# Patient Record
Sex: MALE | Race: WHITE | NOT HISPANIC OR LATINO | Employment: FULL TIME | URBAN - NONMETROPOLITAN AREA
[De-identification: names, ages, dates, MRNs, and addresses within clinical notes are randomized per-mention and may not be internally consistent; named-entity substitution may affect disease eponyms.]

---

## 2017-11-16 ENCOUNTER — APPOINTMENT (OUTPATIENT)
Dept: GENERAL RADIOLOGY | Facility: HOSPITAL | Age: 52
End: 2017-11-16

## 2017-11-16 ENCOUNTER — HOSPITAL ENCOUNTER (EMERGENCY)
Facility: HOSPITAL | Age: 52
Discharge: HOME | End: 2017-11-16
Attending: EMERGENCY MEDICINE | Admitting: EMERGENCY MEDICINE

## 2017-11-16 VITALS
HEART RATE: 70 BPM | TEMPERATURE: 97.7 F | HEIGHT: 63 IN | WEIGHT: 157 LBS | DIASTOLIC BLOOD PRESSURE: 85 MMHG | SYSTOLIC BLOOD PRESSURE: 148 MMHG | BODY MASS INDEX: 27.82 KG/M2 | RESPIRATION RATE: 17 BRPM | OXYGEN SATURATION: 98 %

## 2017-11-16 DIAGNOSIS — S60.222A CONTUSION OF LEFT HAND, INITIAL ENCOUNTER: Primary | ICD-10-CM

## 2017-11-16 PROCEDURE — 99283 EMERGENCY DEPT VISIT LOW MDM: CPT

## 2017-11-16 PROCEDURE — 73130 X-RAY EXAM OF HAND: CPT

## 2017-11-16 NOTE — ED PROVIDER NOTES
Subjective   HPI Comments: 52-year-old male presents with injury to his left third fourth digits after being smashed between metal at work.  He states most of his pain is in the left ring finger, he can move all extremities and his fingers well.  He has mild bruising on the left ring finger.  No break in the skin the laceration.      History provided by:  Patient   used: No        Review of Systems   Musculoskeletal:        Left hand injury   All other systems reviewed and are negative.      Past Medical History:   Diagnosis Date   • Arthritis        No Known Allergies    History reviewed. No pertinent surgical history.    History reviewed. No pertinent family history.    Social History     Social History   • Marital status: Single     Spouse name: N/A   • Number of children: N/A   • Years of education: N/A     Social History Main Topics   • Smoking status: Never Smoker   • Smokeless tobacco: Never Used   • Alcohol use No   • Drug use: No   • Sexual activity: Not Asked     Other Topics Concern   • None     Social History Narrative   • None           Objective   Physical Exam   Constitutional: He is oriented to person, place, and time. He appears well-developed and well-nourished.   HENT:   Head: Normocephalic and atraumatic.   Eyes: EOM are normal.   Neck: Normal range of motion.   Cardiovascular: Normal rate and regular rhythm.    Pulmonary/Chest: Effort normal and breath sounds normal.   Abdominal: Soft.   Musculoskeletal: Normal range of motion. He exhibits tenderness. He exhibits no edema or deformity.   Mild tenderness to palpation left ring finger, neurovascular intact good range of motion.   Neurological: He is alert and oriented to person, place, and time.   Skin: Skin is warm and dry.   Psychiatric: He has a normal mood and affect. His behavior is normal. Judgment and thought content normal.   Nursing note and vitals reviewed.      Procedures         ED Course  ED Course                   [facility]    Final diagnoses:   Contusion of left hand, initial encounter            Bradley Schofield Jr., PA-C  11/16/17 1200

## 2018-10-05 ENCOUNTER — HOSPITAL ENCOUNTER (EMERGENCY)
Facility: HOSPITAL | Age: 53
Discharge: HOME | End: 2018-10-05
Attending: EMERGENCY MEDICINE | Admitting: EMERGENCY MEDICINE

## 2018-10-05 VITALS
RESPIRATION RATE: 16 BRPM | SYSTOLIC BLOOD PRESSURE: 153 MMHG | WEIGHT: 158.8 LBS | HEART RATE: 73 BPM | OXYGEN SATURATION: 99 % | HEIGHT: 62 IN | TEMPERATURE: 98.2 F | BODY MASS INDEX: 29.22 KG/M2 | DIASTOLIC BLOOD PRESSURE: 79 MMHG

## 2018-10-05 DIAGNOSIS — J06.9 VIRAL URI WITH COUGH: Primary | ICD-10-CM

## 2018-10-05 LAB — S PYO AG THROAT QL: NEGATIVE

## 2018-10-05 PROCEDURE — 99283 EMERGENCY DEPT VISIT LOW MDM: CPT

## 2018-10-05 PROCEDURE — 87880 STREP A ASSAY W/OPTIC: CPT

## 2018-10-05 PROCEDURE — 87081 CULTURE SCREEN ONLY: CPT

## 2018-10-05 RX ORDER — PSEUDOEPHEDRINE HYDROCHLORIDE 60 MG/1
60 TABLET, FILM COATED ORAL EVERY 6 HOURS PRN
Qty: 10 TABLET | Refills: 0 | OUTPATIENT
Start: 2018-10-05 | End: 2019-06-08 | Stop reason: HOSPADM

## 2018-10-05 RX ORDER — GUAIFENESIN 200 MG/10ML
200 LIQUID ORAL EVERY 4 HOURS PRN
Qty: 240 ML | Refills: 0 | OUTPATIENT
Start: 2018-10-05 | End: 2019-06-08 | Stop reason: HOSPADM

## 2018-10-05 NOTE — ED PROVIDER NOTES
Subjective   This is a 53-year-old male comes in with chief complaint sore throat, cough, congestion past 3 days.  Patient states he has had mild productive cough with scant white sputum.  Patient denies any associated fever, chills, shortness of breath.  Patient denies any previous history of COPD or asthma.  Denies any known sick contacts.        History provided by:  Patient   used: No    URI   Presenting symptoms: congestion, cough, fatigue, rhinorrhea and sore throat    Presenting symptoms: no fever    Severity:  Moderate  Onset quality:  Sudden  Duration:  3 days  Timing:  Intermittent  Progression:  Worsening  Chronicity:  New  Relieved by:  Nothing  Worsened by:  Nothing  Ineffective treatments:  None tried  Associated symptoms: no arthralgias, no headaches, no myalgias and no neck pain    Risk factors: not elderly, no chronic cardiac disease, no chronic kidney disease, no chronic respiratory disease, no diabetes mellitus, no recent illness, no recent travel and no sick contacts        Review of Systems   Constitutional: Positive for fatigue. Negative for fever.   HENT: Positive for congestion, rhinorrhea and sore throat.    Respiratory: Positive for cough. Negative for chest tightness, shortness of breath and stridor.    Musculoskeletal: Negative for arthralgias, myalgias and neck pain.   Neurological: Negative for headaches.   All other systems reviewed and are negative.      Past Medical History:   Diagnosis Date   • Arthritis    • Collar bone fracture    • Head injury due to trauma    • Shoulder fracture, left        No Known Allergies    History reviewed. No pertinent surgical history.    History reviewed. No pertinent family history.    Social History     Social History   • Marital status: Single     Social History Main Topics   • Smoking status: Never Smoker   • Smokeless tobacco: Never Used   • Alcohol use No   • Drug use: No     Other Topics Concern   • Not on file            Objective   Physical Exam   Constitutional: He is oriented to person, place, and time. He appears well-developed and well-nourished. No distress.   HENT:   Head: Normocephalic.   Right Ear: External ear normal.   Left Ear: External ear normal.   Nose: Rhinorrhea present. Right sinus exhibits no maxillary sinus tenderness and no frontal sinus tenderness. Left sinus exhibits no frontal sinus tenderness.   Mouth/Throat: Mucous membranes are not pale and not dry. Posterior oropharyngeal erythema present. No oropharyngeal exudate. Tonsils are 0 on the right. Tonsils are 0 on the left.   Eyes: Pupils are equal, round, and reactive to light. Conjunctivae and EOM are normal. Right eye exhibits no discharge. Left eye exhibits no discharge. No scleral icterus.   Neck: Normal range of motion. Neck supple. No JVD present. No tracheal deviation present. No thyromegaly present.   Cardiovascular: Normal rate, regular rhythm, normal heart sounds and intact distal pulses.  Exam reveals no friction rub.    No murmur heard.  Pulmonary/Chest: Effort normal and breath sounds normal. No stridor. No respiratory distress. He has no wheezes. He has no rales.   Abdominal: Soft. Bowel sounds are normal. He exhibits no distension and no mass. There is no tenderness. There is no rebound and no guarding.   Musculoskeletal: Normal range of motion. He exhibits no edema, tenderness or deformity.   Neurological: He is alert and oriented to person, place, and time. He displays normal reflexes. No cranial nerve deficit. He exhibits normal muscle tone. Coordination normal.   Skin: Skin is warm. Capillary refill takes less than 2 seconds. No rash noted. He is not diaphoretic. No erythema. No pallor.   Psychiatric: He has a normal mood and affect. His behavior is normal. Judgment and thought content normal.   Nursing note and vitals reviewed.      Procedures           ED Course  ED Course as of Oct 05 1616   Fri Oct 05, 2018   1033 Patient  nontoxic in appearance.  Will be treated supportively.  Advised to return if condition worsens.  []      ED Course User Index  [] Rishabh Barragan PA-C                  MDM      Final diagnoses:   Viral URI with cough            Rishabh Barragan PA-C  10/05/18 2242

## 2018-10-07 LAB — BACTERIA SPEC AEROBE CULT: NORMAL

## 2019-06-08 ENCOUNTER — HOSPITAL ENCOUNTER (EMERGENCY)
Facility: HOSPITAL | Age: 54
Discharge: HOME | End: 2019-06-08
Attending: EMERGENCY MEDICINE | Admitting: EMERGENCY MEDICINE

## 2019-06-08 ENCOUNTER — APPOINTMENT (OUTPATIENT)
Dept: GENERAL RADIOLOGY | Facility: HOSPITAL | Age: 54
End: 2019-06-08

## 2019-06-08 VITALS
HEIGHT: 63 IN | SYSTOLIC BLOOD PRESSURE: 144 MMHG | RESPIRATION RATE: 17 BRPM | TEMPERATURE: 98.3 F | WEIGHT: 158.6 LBS | BODY MASS INDEX: 28.1 KG/M2 | HEART RATE: 74 BPM | OXYGEN SATURATION: 97 % | DIASTOLIC BLOOD PRESSURE: 84 MMHG

## 2019-06-08 DIAGNOSIS — J20.9 ACUTE BRONCHITIS, UNSPECIFIED ORGANISM: Primary | ICD-10-CM

## 2019-06-08 DIAGNOSIS — R09.81 SINUS CONGESTION: ICD-10-CM

## 2019-06-08 LAB
FLUAV AG NPH QL: NEGATIVE
FLUBV AG NPH QL IA: NEGATIVE
S PYO AG THROAT QL: NEGATIVE

## 2019-06-08 PROCEDURE — 63710000001 PREDNISONE PER 1 MG: Performed by: PHYSICIAN ASSISTANT

## 2019-06-08 PROCEDURE — 87081 CULTURE SCREEN ONLY: CPT | Performed by: EMERGENCY MEDICINE

## 2019-06-08 PROCEDURE — 71046 X-RAY EXAM CHEST 2 VIEWS: CPT

## 2019-06-08 PROCEDURE — 87804 INFLUENZA ASSAY W/OPTIC: CPT | Performed by: EMERGENCY MEDICINE

## 2019-06-08 PROCEDURE — 87880 STREP A ASSAY W/OPTIC: CPT | Performed by: EMERGENCY MEDICINE

## 2019-06-08 PROCEDURE — 99283 EMERGENCY DEPT VISIT LOW MDM: CPT

## 2019-06-08 RX ORDER — SENNOSIDES 8.6 MG
650 CAPSULE ORAL EVERY 8 HOURS PRN
Qty: 12 TABLET | Refills: 0 | Status: SHIPPED | OUTPATIENT
Start: 2019-06-08 | End: 2022-01-19

## 2019-06-08 RX ORDER — GUAIFENESIN 200 MG/10ML
200 LIQUID ORAL EVERY 4 HOURS PRN
Qty: 120 ML | Refills: 0 | Status: SHIPPED | OUTPATIENT
Start: 2019-06-08 | End: 2019-06-18

## 2019-06-08 RX ORDER — CETIRIZINE HYDROCHLORIDE 10 MG/1
10 TABLET ORAL DAILY
Qty: 14 TABLET | Refills: 0 | Status: SHIPPED | OUTPATIENT
Start: 2019-06-08 | End: 2022-01-19

## 2019-06-08 RX ORDER — ACETAMINOPHEN 325 MG/1
650 TABLET ORAL ONCE
Status: COMPLETED | OUTPATIENT
Start: 2019-06-08 | End: 2019-06-08

## 2019-06-08 RX ORDER — PREDNISONE 20 MG/1
40 TABLET ORAL ONCE
Status: COMPLETED | OUTPATIENT
Start: 2019-06-08 | End: 2019-06-08

## 2019-06-08 RX ORDER — PREDNISONE 20 MG/1
20 TABLET ORAL 2 TIMES DAILY
Qty: 6 TABLET | Refills: 0 | Status: SHIPPED | OUTPATIENT
Start: 2019-06-08 | End: 2022-01-19

## 2019-06-08 RX ORDER — AZITHROMYCIN 250 MG/1
TABLET, FILM COATED ORAL
Qty: 6 TABLET | Refills: 0 | Status: SHIPPED | OUTPATIENT
Start: 2019-06-08 | End: 2022-01-19

## 2019-06-08 RX ADMIN — PREDNISONE 40 MG: 20 TABLET ORAL at 14:37

## 2019-06-08 RX ADMIN — ACETAMINOPHEN 650 MG: 325 TABLET, FILM COATED ORAL at 14:37

## 2019-06-08 NOTE — ED PROVIDER NOTES
Subjective   The patient is here with complaint of some sore throat nasal congestion sinus congestion some slight headache gradual onset for the past 3 days some otalgia associated occasional cough nonproductive for the most part occasional clear sputum production no hemoptysis no fevers reported no chest pain no abdominal pain no nausea or vomiting patient does request work excuse for today        History provided by:  Patient      Review of Systems   Constitutional: Negative for fever.   HENT: Positive for congestion, sinus pressure and sore throat.    Eyes: Negative.  Negative for pain and visual disturbance.   Respiratory: Positive for cough. Negative for shortness of breath.    Cardiovascular: Negative.    Gastrointestinal: Negative.    Genitourinary: Negative.    Musculoskeletal: Negative.  Negative for neck pain and neck stiffness.   Skin: Negative.    Neurological: Positive for headaches. Negative for seizures, syncope, speech difficulty and weakness.        Some occasional gradual onset headache   Psychiatric/Behavioral: Negative.    All other systems reviewed and are negative.      Past Medical History:   Diagnosis Date   • Arthritis    • Collar bone fracture    • Head injury due to trauma    • Shoulder fracture, left        No Known Allergies    History reviewed. No pertinent surgical history.    History reviewed. No pertinent family history.    Social History     Socioeconomic History   • Marital status: Single     Spouse name: Not on file   • Number of children: Not on file   • Years of education: Not on file   • Highest education level: Not on file   Tobacco Use   • Smoking status: Never Smoker   • Smokeless tobacco: Never Used   Substance and Sexual Activity   • Alcohol use: No   • Drug use: No           Objective   Physical Exam   Constitutional: He is oriented to person, place, and time. He appears well-developed and well-nourished. No distress.   Afebrile nontoxic no acute distress   HENT:   Head:  Normocephalic and atraumatic.   Right Ear: External ear normal.   Left Ear: External ear normal.   Mouth/Throat: Oropharynx is clear and moist. No oropharyngeal exudate.   Posterior pharynx clear to midline no airway compromise   Eyes: Conjunctivae and EOM are normal. Pupils are equal, round, and reactive to light.   Neck: Normal range of motion. Neck supple.   No meningismus   Cardiovascular: Normal rate, regular rhythm and intact distal pulses.   Pulmonary/Chest: Effort normal and breath sounds normal. No stridor. He has no wheezes.   Abdominal: Soft. Bowel sounds are normal. There is no tenderness. There is no guarding.   Musculoskeletal: Normal range of motion. He exhibits no edema.   Lymphadenopathy:     He has no cervical adenopathy.   Neurological: He is alert and oriented to person, place, and time. No cranial nerve deficit or sensory deficit. He exhibits normal muscle tone. Coordination normal.   Skin: Skin is warm and dry. Capillary refill takes less than 2 seconds. No rash noted. He is not diaphoretic. No erythema.   Psychiatric: He has a normal mood and affect. His behavior is normal. Judgment and thought content normal.   Nursing note and vitals reviewed.      Procedures           ED Course  ED Course as of Jun 08 1455   Sat Jun 08, 2019   1452 Plan on treating with Z-Duong, other medication including decongestant, follow-up with what has clinic within 2 days return here for any sudden changes worsening symptom concerns work excuse for today and tomorrow  [SC]      ED Course User Index  [SC] Boubacar Ocampo, GABRIELA                  MDM  Number of Diagnoses or Management Options     Amount and/or Complexity of Data Reviewed  Review and summarize past medical records: yes  Discuss the patient with other providers: yes    Risk of Complications, Morbidity, and/or Mortality  Presenting problems: low  Diagnostic procedures: low  Management options: low          Final diagnoses:   Acute bronchitis,  unspecified organism   Sinus congestion            Boubacar Ocampo, GABRIELA  06/08/19 7154

## 2019-06-11 LAB — BACTERIA SPEC AEROBE CULT: NORMAL

## 2019-09-26 ENCOUNTER — HOSPITAL ENCOUNTER (EMERGENCY)
Facility: HOSPITAL | Age: 54
Discharge: HOME | End: 2019-09-27
Attending: STUDENT IN AN ORGANIZED HEALTH CARE EDUCATION/TRAINING PROGRAM | Admitting: STUDENT IN AN ORGANIZED HEALTH CARE EDUCATION/TRAINING PROGRAM

## 2019-09-26 DIAGNOSIS — K08.89 PAIN, DENTAL: Primary | ICD-10-CM

## 2019-09-26 PROCEDURE — 99283 EMERGENCY DEPT VISIT LOW MDM: CPT

## 2019-09-26 RX ORDER — HYDROCODONE BITARTRATE AND ACETAMINOPHEN 5; 325 MG/1; MG/1
1 TABLET ORAL ONCE
Status: COMPLETED | OUTPATIENT
Start: 2019-09-27 | End: 2019-09-27

## 2019-09-26 RX ORDER — AMOXICILLIN AND CLAVULANATE POTASSIUM 875; 125 MG/1; MG/1
1 TABLET, FILM COATED ORAL ONCE
Status: COMPLETED | OUTPATIENT
Start: 2019-09-27 | End: 2019-09-27

## 2019-09-27 VITALS
OXYGEN SATURATION: 98 % | HEART RATE: 70 BPM | TEMPERATURE: 98 F | WEIGHT: 162 LBS | HEIGHT: 63 IN | RESPIRATION RATE: 18 BRPM | SYSTOLIC BLOOD PRESSURE: 172 MMHG | DIASTOLIC BLOOD PRESSURE: 95 MMHG | BODY MASS INDEX: 28.7 KG/M2

## 2019-09-27 RX ORDER — AMOXICILLIN AND CLAVULANATE POTASSIUM 875; 125 MG/1; MG/1
1 TABLET, FILM COATED ORAL EVERY 12 HOURS
Qty: 14 TABLET | Refills: 0 | Status: SHIPPED | OUTPATIENT
Start: 2019-09-27 | End: 2019-10-04

## 2019-09-27 RX ORDER — ETODOLAC 200 MG/1
200 CAPSULE ORAL EVERY 8 HOURS
Qty: 15 CAPSULE | Refills: 0 | Status: SHIPPED | OUTPATIENT
Start: 2019-09-27 | End: 2022-01-19

## 2019-09-27 RX ADMIN — AMOXICILLIN AND CLAVULANATE POTASSIUM 1 TABLET: 875; 125 TABLET, FILM COATED ORAL at 00:19

## 2019-09-27 RX ADMIN — HYDROCODONE BITARTRATE AND ACETAMINOPHEN 1 TABLET: 5; 325 TABLET ORAL at 00:19

## 2020-01-09 ENCOUNTER — HOSPITAL ENCOUNTER (EMERGENCY)
Facility: HOSPITAL | Age: 55
Discharge: HOME | End: 2020-01-09
Attending: EMERGENCY MEDICINE | Admitting: EMERGENCY MEDICINE

## 2020-01-09 VITALS
HEIGHT: 63 IN | RESPIRATION RATE: 18 BRPM | SYSTOLIC BLOOD PRESSURE: 160 MMHG | DIASTOLIC BLOOD PRESSURE: 81 MMHG | HEART RATE: 64 BPM | TEMPERATURE: 97.8 F | BODY MASS INDEX: 28.84 KG/M2 | OXYGEN SATURATION: 96 % | WEIGHT: 162.8 LBS

## 2020-01-09 DIAGNOSIS — K04.7 DENTAL ABSCESS: Primary | ICD-10-CM

## 2020-01-09 LAB — S PYO AG THROAT QL: NEGATIVE

## 2020-01-09 PROCEDURE — 87880 STREP A ASSAY W/OPTIC: CPT | Performed by: EMERGENCY MEDICINE

## 2020-01-09 PROCEDURE — 63710000001 DEXAMETHASONE PER 0.25 MG: Performed by: EMERGENCY MEDICINE

## 2020-01-09 PROCEDURE — 87081 CULTURE SCREEN ONLY: CPT | Performed by: EMERGENCY MEDICINE

## 2020-01-09 PROCEDURE — 99283 EMERGENCY DEPT VISIT LOW MDM: CPT

## 2020-01-09 RX ORDER — DEXAMETHASONE 4 MG/1
4 TABLET ORAL ONCE
Status: COMPLETED | OUTPATIENT
Start: 2020-01-09 | End: 2020-01-09

## 2020-01-09 RX ORDER — PENICILLIN V POTASSIUM 250 MG/1
500 TABLET ORAL ONCE
Status: COMPLETED | OUTPATIENT
Start: 2020-01-09 | End: 2020-01-09

## 2020-01-09 RX ORDER — PENICILLIN V POTASSIUM 500 MG/1
500 TABLET ORAL 4 TIMES DAILY
Qty: 40 TABLET | Refills: 0 | Status: SHIPPED | OUTPATIENT
Start: 2020-01-09 | End: 2022-01-19

## 2020-01-09 RX ADMIN — DEXAMETHASONE 4 MG: 4 TABLET ORAL at 02:39

## 2020-01-09 RX ADMIN — PENICILLIN V POTASIUM 500 MG: 250 TABLET ORAL at 02:39

## 2020-01-11 LAB — BACTERIA SPEC AEROBE CULT: NORMAL

## 2020-03-20 ENCOUNTER — APPOINTMENT (OUTPATIENT)
Dept: GENERAL RADIOLOGY | Facility: HOSPITAL | Age: 55
End: 2020-03-20

## 2020-03-20 ENCOUNTER — HOSPITAL ENCOUNTER (EMERGENCY)
Facility: HOSPITAL | Age: 55
Discharge: HOME | End: 2020-03-21
Attending: EMERGENCY MEDICINE | Admitting: EMERGENCY MEDICINE

## 2020-03-20 DIAGNOSIS — J06.9 VIRAL UPPER RESPIRATORY TRACT INFECTION: ICD-10-CM

## 2020-03-20 DIAGNOSIS — J40 BRONCHITIS: Primary | ICD-10-CM

## 2020-03-20 LAB
FLUAV AG NPH QL: NEGATIVE
FLUBV AG NPH QL IA: NEGATIVE
S PYO AG THROAT QL: NEGATIVE

## 2020-03-20 PROCEDURE — 87081 CULTURE SCREEN ONLY: CPT | Performed by: PHYSICIAN ASSISTANT

## 2020-03-20 PROCEDURE — 71045 X-RAY EXAM CHEST 1 VIEW: CPT

## 2020-03-20 PROCEDURE — 99283 EMERGENCY DEPT VISIT LOW MDM: CPT

## 2020-03-20 PROCEDURE — 87880 STREP A ASSAY W/OPTIC: CPT | Performed by: PHYSICIAN ASSISTANT

## 2020-03-20 PROCEDURE — 87804 INFLUENZA ASSAY W/OPTIC: CPT | Performed by: PHYSICIAN ASSISTANT

## 2020-03-20 RX ORDER — BENZONATATE 100 MG/1
100 CAPSULE ORAL 3 TIMES DAILY PRN
Qty: 12 CAPSULE | Refills: 0 | Status: SHIPPED | OUTPATIENT
Start: 2020-03-20 | End: 2022-01-19

## 2020-03-20 RX ORDER — BENZONATATE 100 MG/1
100 CAPSULE ORAL ONCE
Status: COMPLETED | OUTPATIENT
Start: 2020-03-20 | End: 2020-03-20

## 2020-03-20 RX ORDER — NAPROXEN 500 MG/1
500 TABLET ORAL 2 TIMES DAILY WITH MEALS
COMMUNITY

## 2020-03-20 RX ORDER — DOXYCYCLINE 100 MG/1
100 CAPSULE ORAL 2 TIMES DAILY
Qty: 14 CAPSULE | Refills: 0 | Status: SHIPPED | OUTPATIENT
Start: 2020-03-20 | End: 2020-03-27

## 2020-03-20 RX ORDER — DOXYCYCLINE 100 MG/1
100 CAPSULE ORAL ONCE
Status: COMPLETED | OUTPATIENT
Start: 2020-03-20 | End: 2020-03-20

## 2020-03-20 RX ADMIN — BENZONATATE 100 MG: 100 CAPSULE ORAL at 23:55

## 2020-03-20 RX ADMIN — DOXYCYCLINE 100 MG: 100 CAPSULE ORAL at 23:55

## 2020-03-21 VITALS
HEART RATE: 67 BPM | WEIGHT: 162 LBS | RESPIRATION RATE: 18 BRPM | HEIGHT: 63 IN | TEMPERATURE: 98 F | OXYGEN SATURATION: 96 % | BODY MASS INDEX: 28.7 KG/M2 | DIASTOLIC BLOOD PRESSURE: 88 MMHG | SYSTOLIC BLOOD PRESSURE: 166 MMHG

## 2020-03-22 LAB — BACTERIA SPEC AEROBE CULT: NORMAL

## 2021-06-29 ENCOUNTER — HOSPITAL ENCOUNTER (EMERGENCY)
Facility: HOSPITAL | Age: 56
Discharge: HOME | End: 2021-06-29
Attending: EMERGENCY MEDICINE | Admitting: EMERGENCY MEDICINE

## 2021-06-29 ENCOUNTER — APPOINTMENT (OUTPATIENT)
Dept: CT IMAGING | Facility: HOSPITAL | Age: 56
End: 2021-06-29

## 2021-06-29 VITALS
WEIGHT: 169.6 LBS | SYSTOLIC BLOOD PRESSURE: 168 MMHG | OXYGEN SATURATION: 97 % | BODY MASS INDEX: 30.05 KG/M2 | DIASTOLIC BLOOD PRESSURE: 94 MMHG | HEART RATE: 65 BPM | TEMPERATURE: 98.3 F | RESPIRATION RATE: 16 BRPM | HEIGHT: 63 IN

## 2021-06-29 DIAGNOSIS — R20.2 PARESTHESIAS: Primary | ICD-10-CM

## 2021-06-29 LAB
ALBUMIN SERPL-MCNC: 4.2 G/DL (ref 3.5–5.2)
ALBUMIN/GLOB SERPL: 1.7 G/DL
ALP SERPL-CCNC: 106 U/L (ref 39–117)
ALT SERPL W P-5'-P-CCNC: 68 U/L (ref 1–41)
ANION GAP SERPL CALCULATED.3IONS-SCNC: 8.8 MMOL/L (ref 5–15)
AST SERPL-CCNC: 47 U/L (ref 1–40)
BASOPHILS # BLD AUTO: 0.04 10*3/MM3 (ref 0–0.2)
BASOPHILS NFR BLD AUTO: 0.6 % (ref 0–1.5)
BILIRUB SERPL-MCNC: 0.3 MG/DL (ref 0–1.2)
BUN SERPL-MCNC: 19 MG/DL (ref 6–20)
BUN/CREAT SERPL: 25.3 (ref 7–25)
CALCIUM SPEC-SCNC: 9.5 MG/DL (ref 8.6–10.5)
CHLORIDE SERPL-SCNC: 109 MMOL/L (ref 98–107)
CO2 SERPL-SCNC: 26.2 MMOL/L (ref 22–29)
CREAT SERPL-MCNC: 0.75 MG/DL (ref 0.76–1.27)
DEPRECATED RDW RBC AUTO: 41.3 FL (ref 37–54)
EOSINOPHIL # BLD AUTO: 0.19 10*3/MM3 (ref 0–0.4)
EOSINOPHIL NFR BLD AUTO: 3 % (ref 0.3–6.2)
ERYTHROCYTE [DISTWIDTH] IN BLOOD BY AUTOMATED COUNT: 13.6 % (ref 12.3–15.4)
GFR SERPL CREATININE-BSD FRML MDRD: 108 ML/MIN/1.73
GLOBULIN UR ELPH-MCNC: 2.5 GM/DL
GLUCOSE SERPL-MCNC: 97 MG/DL (ref 65–99)
HCT VFR BLD AUTO: 44.4 % (ref 37.5–51)
HGB BLD-MCNC: 15.1 G/DL (ref 13–17.7)
IMM GRANULOCYTES # BLD AUTO: 0.03 10*3/MM3 (ref 0–0.05)
IMM GRANULOCYTES NFR BLD AUTO: 0.5 % (ref 0–0.5)
LYMPHOCYTES # BLD AUTO: 1.76 10*3/MM3 (ref 0.7–3.1)
LYMPHOCYTES NFR BLD AUTO: 27.3 % (ref 19.6–45.3)
MCH RBC QN AUTO: 28.2 PG (ref 26.6–33)
MCHC RBC AUTO-ENTMCNC: 34 G/DL (ref 31.5–35.7)
MCV RBC AUTO: 83 FL (ref 79–97)
MONOCYTES # BLD AUTO: 0.64 10*3/MM3 (ref 0.1–0.9)
MONOCYTES NFR BLD AUTO: 9.9 % (ref 5–12)
NEUTROPHILS NFR BLD AUTO: 3.78 10*3/MM3 (ref 1.7–7)
NEUTROPHILS NFR BLD AUTO: 58.7 % (ref 42.7–76)
NRBC BLD AUTO-RTO: 0 /100 WBC (ref 0–0.2)
PLATELET # BLD AUTO: 197 10*3/MM3 (ref 140–450)
PMV BLD AUTO: 9.6 FL (ref 6–12)
POTASSIUM SERPL-SCNC: 3.7 MMOL/L (ref 3.5–5.2)
PROT SERPL-MCNC: 6.7 G/DL (ref 6–8.5)
RBC # BLD AUTO: 5.35 10*6/MM3 (ref 4.14–5.8)
SODIUM SERPL-SCNC: 144 MMOL/L (ref 136–145)
WBC # BLD AUTO: 6.44 10*3/MM3 (ref 3.4–10.8)

## 2021-06-29 PROCEDURE — 70450 CT HEAD/BRAIN W/O DYE: CPT

## 2021-06-29 PROCEDURE — 99283 EMERGENCY DEPT VISIT LOW MDM: CPT

## 2021-06-29 PROCEDURE — 80053 COMPREHEN METABOLIC PANEL: CPT | Performed by: PHYSICIAN ASSISTANT

## 2021-06-29 PROCEDURE — 85025 COMPLETE CBC W/AUTO DIFF WBC: CPT | Performed by: PHYSICIAN ASSISTANT

## 2021-06-29 PROCEDURE — 93005 ELECTROCARDIOGRAM TRACING: CPT | Performed by: PHYSICIAN ASSISTANT

## 2021-08-18 ENCOUNTER — TRANSCRIBE ORDERS (OUTPATIENT)
Dept: PHYSICAL THERAPY | Facility: CLINIC | Age: 56
End: 2021-08-18

## 2021-08-18 DIAGNOSIS — M25.519 SHOULDER PAIN, UNSPECIFIED CHRONICITY, UNSPECIFIED LATERALITY: Primary | ICD-10-CM

## 2021-08-25 ENCOUNTER — TREATMENT (OUTPATIENT)
Dept: PHYSICAL THERAPY | Facility: CLINIC | Age: 56
End: 2021-08-25

## 2021-08-25 DIAGNOSIS — M54.2 PAIN, NECK: ICD-10-CM

## 2021-08-25 DIAGNOSIS — M25.512 CHRONIC PAIN OF BOTH SHOULDERS: Primary | ICD-10-CM

## 2021-08-25 DIAGNOSIS — G89.29 CHRONIC PAIN OF BOTH SHOULDERS: Primary | ICD-10-CM

## 2021-08-25 DIAGNOSIS — M25.511 CHRONIC PAIN OF BOTH SHOULDERS: Primary | ICD-10-CM

## 2021-08-25 PROCEDURE — 97140 MANUAL THERAPY 1/> REGIONS: CPT | Performed by: PHYSICAL THERAPIST

## 2021-08-25 PROCEDURE — 97110 THERAPEUTIC EXERCISES: CPT | Performed by: PHYSICAL THERAPIST

## 2021-08-25 PROCEDURE — 97112 NEUROMUSCULAR REEDUCATION: CPT | Performed by: PHYSICAL THERAPIST

## 2021-08-25 PROCEDURE — 97162 PT EVAL MOD COMPLEX 30 MIN: CPT | Performed by: PHYSICAL THERAPIST

## 2021-08-25 NOTE — PROGRESS NOTES
Physical Therapy Initial Evaluation and Plan of Care    TOTAL TIME: 60 MINUTES    Subjective Evaluation    History of Present Illness  Mechanism of injury: Patient presents today with c/o left shoulder/arm pain that extends down to the elbow, occasional tingling in fingers; also goes up into the neck occasionally;  states the pain occasionally goes down the left leg to the great toe    States he had an injury in the , right arm was 'shoved up' and is now shorter than the left; states he has had multiple broken bones    Was working two full time jobs prior to covid, has not worked in over a year    Pain increased a couple of months ago    Takes Naproxen    Denies previous left shoulder surgery     Recently started blood pressure medicine       Patient Occupation: not employed currently Pain  Current pain ratin  At worst pain ratin  Quality: discomfort and dull ache  Relieving factors: medications  Aggravating factors: movement, lifting, sleeping, prolonged positioning, outstretched reach, repetitive movement and overhead activity    Patient Goals  Patient goals for therapy: increased motion, decreased pain, increased strength, independence with ADLs/IADLs, return to sport/leisure activities and return to work             Objective          Postural Observations  Seated posture: poor    Additional Postural Observation Details  Leans laterally  Forward head, rounded shoulders, protracted scapulae    Cervical/Thoracic Screen   Cervical range of motion within normal limits with the following exceptions: Decreased left rotation to ~ 20 degrees  Right rotation WFL    Reports multiple previous head injuries, facial injuries    Pops neck for relief ; has headaches    Extension/flexion ROM WFL     Neurological Testing     Sensation     Shoulder   Left Shoulder   Paresthesia: light touch    Right Shoulder   Paresthesia: light touch    Reflexes   Left   Biceps (C5/C6): normal (2+)  Brachioradialis (C6): normal  (2+)  Triceps (C7): normal (2+)    Right   Biceps (C5/C6): normal (2+)  Brachioradialis (C6): normal (2+)  Triceps (C7): normal (2+)    Active Range of Motion   Left Shoulder   Flexion: 90 degrees   Abduction: 80 degrees   External rotation BTH: C2   Internal rotation BTB: sacrum     Right Shoulder   Flexion: 85 degrees   Abduction: 90 degrees   External rotation BTH: C2   Internal rotation BTB: sacrum     Strength/Myotome Testing     Left Shoulder     Planes of Motion   Flexion: 3+   Abduction: 3+   External rotation at 0°: 4   Internal rotation at 0°: 4+     Right Shoulder     Planes of Motion   Flexion: 3+   Abduction: 3+   External rotation at 0°: 4   Internal rotation at 0°: 4+     Tests     Left Shoulder   Positive empty can, Hawkin's and Neer's.   Negative Mac's sign.       Access Code: [email or web address]  URL: [email or web address]  Date: 08/25/2021  Prepared by: Igor Jackson    Exercises  Supine Chin Tuck - 3 x daily - 7 x weekly - 3 sets - 10 reps  Supine Cervical Rotation AROM on Pillow - 3 x daily - 7 x weekly - 3 sets - 10 reps  Supine Cervical Sidebending Stretch - 3 x daily - 7 x weekly - 1 sets - 10 reps - 10 seconds hold  Supine Shoulder Flexion with Dowel - 3 x daily - 7 x weekly - 3 sets - 10 reps  Supine Shoulder Flexion Overhead with Dowel - 3 x daily - 7 x weekly - 3 sets - 10 reps  Supine Shoulder External Rotation with Dowel - 3 x daily - 7 x weekly - 3 sets - 10 reps  Standing Backward Shoulder Rolls - 3 x daily - 7 x weekly - 3 sets - 10 reps  Seated Scapular Retraction - 3 x daily - 7 x weekly - 3 sets - 10 reps  Standing Shoulder Internal Rotation Stretch with Towel - 3 x daily - 7 x weekly - 3 sets - 10 reps  Standing Shoulder Flexion Stretch on Wall - 3 x daily - 7 x weekly - 3 sets - 10 reps      Assessment & Plan     Assessment  Impairments: abnormal muscle tone, abnormal or restricted ROM, activity intolerance, impaired physical strength, lacks appropriate home exercise program and  pain with function  Assessment details: 57 yo presents with s/s of left shoulder impingement and hypomobility of left>right shoulders, with concomitant left cervical spine dysfunction; he responded well to treatment today, and should benefit from PT to restore full, functional mobility and strength in order to be able to RTW without pain or dysfunction  Prognosis: fair  Functional Limitations: carrying objects, lifting, sleeping, pulling, pushing, uncomfortable because of pain, reaching behind back, reaching overhead and unable to perform repetitive tasks  Goals  Plan Goals: 4 weeks:  1. IND with HEP  2. Patient to display full, functional ROM of left shoulder and cervical spine without pain  3. Patient to display 5/5 MMT of left UE  4. Patient to report a 1 MCID of Quick Dash score     Plan  Therapy options: will be seen for skilled physical therapy services  Planned modality interventions: iontophoresis, TENS, thermotherapy (hydrocollator packs), ultrasound, traction, high voltage pulsed current (pain management), electrical stimulation/Russian stimulation, dry needling and cryotherapy  Planned therapy interventions: manual therapy, neuromuscular re-education, postural training, soft tissue mobilization, spinal/joint mobilization, stretching, strengthening, therapeutic activities, joint mobilization, home exercise program, functional ROM exercises and flexibility  Frequency: 2x week  Duration in visits: 8  Treatment plan discussed with: patient        Manual Therapy:    15     mins  72485;  Therapeutic Exercise:    15     mins  96011;     Neuromuscular Kiana:    15    mins  16662;    Therapeutic Activity:          mins  08444;     Gait Training:           mins  33621;     Ultrasound:          mins  34656;    Electrical Stimulation:         mins  31283 ( );  Dry Needling          mins self-pay    Timed Treatment:   45   mins   Total Treatment:     60   mins    PT SIGNATURE: KELSI Jackson, PT   DATE  TREATMENT INITIATED: 8/25/2021    Initial Certification  Certification Period: 11/23/2021  I certify that the therapy services are furnished while this patient is under my care.  The services outlined above are required by this patient, and will be reviewed every 90 days.     PHYSICIAN: Rosa Maria Hester, MICHELLE      DATE:     Please sign and return via fax to  .. Thank you, [facility].

## 2021-08-31 ENCOUNTER — TREATMENT (OUTPATIENT)
Dept: PHYSICAL THERAPY | Facility: CLINIC | Age: 56
End: 2021-08-31

## 2021-08-31 DIAGNOSIS — G89.29 CHRONIC PAIN OF BOTH SHOULDERS: Primary | ICD-10-CM

## 2021-08-31 DIAGNOSIS — M25.511 CHRONIC PAIN OF BOTH SHOULDERS: Primary | ICD-10-CM

## 2021-08-31 DIAGNOSIS — M54.2 PAIN, NECK: ICD-10-CM

## 2021-08-31 DIAGNOSIS — M25.512 CHRONIC PAIN OF BOTH SHOULDERS: Primary | ICD-10-CM

## 2021-08-31 PROCEDURE — 97112 NEUROMUSCULAR REEDUCATION: CPT | Performed by: PHYSICAL THERAPIST

## 2021-08-31 PROCEDURE — 97530 THERAPEUTIC ACTIVITIES: CPT | Performed by: PHYSICAL THERAPIST

## 2021-08-31 PROCEDURE — 97110 THERAPEUTIC EXERCISES: CPT | Performed by: PHYSICAL THERAPIST

## 2021-08-31 PROCEDURE — 97140 MANUAL THERAPY 1/> REGIONS: CPT | Performed by: PHYSICAL THERAPIST

## 2021-08-31 NOTE — PROGRESS NOTES
Physical Therapy Daily Progress Note    TOTAL TIME: 55 MINUTES    Андрей Ortiz reports: continued c/o shoulder stiffness bilaterally; hard to turn head at times; helps to do the exercises; ordered some pulleys for home use       Objective   See Exercise, Manual, and Modality Logs for complete treatment.     THERAPEUTIC EXERCISES/ACTIVITIES ADDED TODAY: see flow sheets; green tband shoulder pulls and rows 2/15 each; green tband pull aparts, green tband hor abd 2 x 15 each  Exercises  Supine Chin Tuck - 3 x daily - 7 x weekly - 3 sets - 10 reps  Supine Cervical Rotation AROM on Pillow - 3 x daily - 7 x weekly - 3 sets - 10 reps  Supine Cervical Sidebending Stretch - 3 x daily - 7 x weekly - 1 sets - 10 reps - 10 seconds hold  Supine Shoulder Flexion with Dowel - 3 x daily - 7 x weekly - 3 sets - 10 reps  Supine Shoulder Flexion Overhead with Dowel - 3 x daily - 7 x weekly - 3 sets - 10 reps  Supine Shoulder External Rotation with Dowel - 3 x daily - 7 x weekly - 3 sets - 10 reps  Standing Backward Shoulder Rolls - 3 x daily - 7 x weekly - 3 sets - 10 reps  Seated Scapular Retraction - 3 x daily - 7 x weekly - 3 sets - 10 reps  Standing Shoulder Internal Rotation Stretch with Towel - 3 x daily - 7 x weekly - 3 sets - 10 reps  Standing Shoulder Flexion Stretch on Wall - 3 x daily - 7 x weekly - 3 sets - 10 reps     Manual: cervical mobilizations, sub occ release, traction    Assessment/Plan  Improved shoulder and cervical ROM after therex and manual therapy; needs continued PT; performed well with strengthening tasks    Progress per Plan of Care and Progress strengthening /stabilization /functional activity           Manual Therapy:    10     mins  65908;  Therapeutic Exercise:    15     mins  02204;     Neuromuscular Kiana:    15    mins  47276;    Therapeutic Activity:     15     mins  82310;     Gait Training:           mins  24039;     Ultrasound:          mins  10182;    Electrical Stimulation:         mins  62642  ( );  Dry Needling          mins self-pay    Timed Treatment:   55   mins   Total Treatment:     55   mins    KELSI Jackson, PT  Physical Therapist

## 2021-09-02 ENCOUNTER — TREATMENT (OUTPATIENT)
Dept: PHYSICAL THERAPY | Facility: CLINIC | Age: 56
End: 2021-09-02

## 2021-09-02 DIAGNOSIS — G89.29 CHRONIC PAIN OF BOTH SHOULDERS: Primary | ICD-10-CM

## 2021-09-02 DIAGNOSIS — M25.512 CHRONIC PAIN OF BOTH SHOULDERS: Primary | ICD-10-CM

## 2021-09-02 DIAGNOSIS — M25.511 CHRONIC PAIN OF BOTH SHOULDERS: Primary | ICD-10-CM

## 2021-09-02 PROCEDURE — 97110 THERAPEUTIC EXERCISES: CPT | Performed by: PHYSICAL THERAPIST

## 2021-09-02 PROCEDURE — 97112 NEUROMUSCULAR REEDUCATION: CPT | Performed by: PHYSICAL THERAPIST

## 2021-09-02 PROCEDURE — 97530 THERAPEUTIC ACTIVITIES: CPT | Performed by: PHYSICAL THERAPIST

## 2021-09-02 PROCEDURE — 97140 MANUAL THERAPY 1/> REGIONS: CPT | Performed by: PHYSICAL THERAPIST

## 2021-09-02 NOTE — PROGRESS NOTES
Physical Therapy Daily Progress Note    TOTAL TIME: 55 MINUTES    Андрей Ortiz reports: feeling good, was able to re-arrange and clean my whole apartment and did not have any increase in pain      Objective   See Exercise, Manual, and Modality Logs for complete treatment.     THERAPEUTIC EXERCISES/ACTIVITIES ADDED TODAY: cervical flexor endurance in supine , cervical isometrics bilaterally   see flow sheets; green tband shoulder pulls and rows 2/15 each; green tband pull aparts, green tband hor abd 2 x 15 each  Exercises  Supine Chin Tuck - 3 x daily - 7 x weekly - 3 sets - 10 reps  Supine Cervical Rotation AROM on Pillow - 3 x daily - 7 x weekly - 3 sets - 10 reps  Supine Cervical Sidebending Stretch - 3 x daily - 7 x weekly - 1 sets - 10 reps - 10 seconds hold  Supine Shoulder Flexion with Dowel - 3 x daily - 7 x weekly - 3 sets - 10 reps  Supine Shoulder Flexion Overhead with Dowel - 3 x daily - 7 x weekly - 3 sets - 10 reps  Supine Shoulder External Rotation with Dowel - 3 x daily - 7 x weekly - 3 sets - 10 reps  Standing Backward Shoulder Rolls - 3 x daily - 7 x weekly - 3 sets - 10 reps  Seated Scapular Retraction - 3 x daily - 7 x weekly - 3 sets - 10 reps  Standing Shoulder Internal Rotation Stretch with Towel - 3 x daily - 7 x weekly - 3 sets - 10 reps  Standing Shoulder Flexion Stretch on Wall - 3 x daily - 7 x weekly - 3 sets - 10 reps     Manual: cervical mobilizations, sub occ release, traction, STM to left c/s p/s mm, cervical isometrics bilaterally for side bending     Assessment/Plan  PATIENT NEEDS CONTINUED PHYSICAL THERAPY IN ORDER TO ACHIEVE FULL ROM, STRENGTH AND FUNCTION WITH NO REPORTS OF PAIN IN ORDER TO RTW WITHOUT RESTRICTIONS AND WITHOUT PAIN OR DYSFUNCTION       Progress per Plan of Care and Progress strengthening /stabilization /functional activity           Manual Therapy:    10     mins  73002;  Therapeutic Exercise:    15     mins  32134;     Neuromuscular Kiana:    15    mins  57609;     Therapeutic Activity:     15     mins  13898;     Gait Training:           mins  19300;     Ultrasound:          mins  93639;    Electrical Stimulation:         mins  77247 ( );  Dry Needling          mins self-pay    Timed Treatment:   55   mins   Total Treatment:     55   mins    KELSI Jackson, PT  Physical Therapist

## 2021-09-09 ENCOUNTER — TREATMENT (OUTPATIENT)
Dept: PHYSICAL THERAPY | Facility: CLINIC | Age: 56
End: 2021-09-09

## 2021-09-09 DIAGNOSIS — M25.512 CHRONIC PAIN OF BOTH SHOULDERS: Primary | ICD-10-CM

## 2021-09-09 DIAGNOSIS — G89.29 CHRONIC PAIN OF BOTH SHOULDERS: Primary | ICD-10-CM

## 2021-09-09 DIAGNOSIS — M54.2 PAIN, NECK: ICD-10-CM

## 2021-09-09 DIAGNOSIS — M25.511 CHRONIC PAIN OF BOTH SHOULDERS: Primary | ICD-10-CM

## 2021-09-09 PROCEDURE — 97110 THERAPEUTIC EXERCISES: CPT | Performed by: PHYSICAL THERAPIST

## 2021-09-09 PROCEDURE — 97140 MANUAL THERAPY 1/> REGIONS: CPT | Performed by: PHYSICAL THERAPIST

## 2021-09-09 PROCEDURE — 97112 NEUROMUSCULAR REEDUCATION: CPT | Performed by: PHYSICAL THERAPIST

## 2021-09-09 PROCEDURE — 97530 THERAPEUTIC ACTIVITIES: CPT | Performed by: PHYSICAL THERAPIST

## 2021-09-09 NOTE — PROGRESS NOTES
Physical Therapy Daily Progress Note    TOTAL TIME: 55 MINUTES    Андрей Ortiz reports: got some pulleys for home; feels better overall; neck and shoulder still stiff at times       Objective   See Exercise, Manual, and Modality Logs for complete treatment.     THERAPEUTIC EXERCISES/ACTIVITIES ADDED TODAY: shoulder pull aparts with 3 different hand positions  cervical flexor endurance in supine , cervical isometrics bilaterally   see flow sheets; green tband shoulder pulls and rows 2/15 each; green tband pull aparts, green tband hor abd 2 x 15 each  Exercises  Supine Chin Tuck - 3 x daily - 7 x weekly - 3 sets - 10 reps  Supine Cervical Rotation AROM on Pillow - 3 x daily - 7 x weekly - 3 sets - 10 reps  Supine Cervical Sidebending Stretch - 3 x daily - 7 x weekly - 1 sets - 10 reps - 10 seconds hold  Supine Shoulder Flexion with Dowel - 3 x daily - 7 x weekly - 3 sets - 10 reps  Supine Shoulder Flexion Overhead with Dowel - 3 x daily - 7 x weekly - 3 sets - 10 reps  Supine Shoulder External Rotation with Dowel - 3 x daily - 7 x weekly - 3 sets - 10 reps  Standing Backward Shoulder Rolls - 3 x daily - 7 x weekly - 3 sets - 10 reps  Seated Scapular Retraction - 3 x daily - 7 x weekly - 3 sets - 10 reps  Standing Shoulder Internal Rotation Stretch with Towel - 3 x daily - 7 x weekly - 3 sets - 10 reps  Standing Shoulder Flexion Stretch on Wall - 3 x daily - 7 x weekly - 3 sets - 10 reps     Manual: cervical mobilizations, sub occ release, traction, STM to left c/s p/s mm     Assessment/Plan  Patient is making excellent progress overall; needs continued PT to improve functional mobility, strength and endurance    Progress per Plan of Care and Progress strengthening /stabilization /functional activity           Manual Therapy:    10     mins  04288;  Therapeutic Exercise:    20     mins  01746;     Neuromuscular Kiana:    15    mins  62004;    Therapeutic Activity:     10     mins  25420;     Gait Training:            mins  05551;     Ultrasound:          mins  56417;    Electrical Stimulation:         mins  11280 ( );  Dry Needling          mins self-pay    Timed Treatment:   55   mins   Total Treatment:     55   mins    KELSI Jackson PT  Physical Therapist

## 2021-09-14 ENCOUNTER — TREATMENT (OUTPATIENT)
Dept: PHYSICAL THERAPY | Facility: CLINIC | Age: 56
End: 2021-09-14

## 2021-09-14 DIAGNOSIS — M54.2 PAIN, NECK: ICD-10-CM

## 2021-09-14 DIAGNOSIS — M25.512 CHRONIC PAIN OF BOTH SHOULDERS: Primary | ICD-10-CM

## 2021-09-14 DIAGNOSIS — M25.511 CHRONIC PAIN OF BOTH SHOULDERS: Primary | ICD-10-CM

## 2021-09-14 DIAGNOSIS — G89.29 CHRONIC PAIN OF BOTH SHOULDERS: Primary | ICD-10-CM

## 2021-09-14 PROCEDURE — 97112 NEUROMUSCULAR REEDUCATION: CPT | Performed by: PHYSICAL THERAPIST

## 2021-09-14 PROCEDURE — 97140 MANUAL THERAPY 1/> REGIONS: CPT | Performed by: PHYSICAL THERAPIST

## 2021-09-14 PROCEDURE — 97530 THERAPEUTIC ACTIVITIES: CPT | Performed by: PHYSICAL THERAPIST

## 2021-09-14 PROCEDURE — 97110 THERAPEUTIC EXERCISES: CPT | Performed by: PHYSICAL THERAPIST

## 2021-09-14 NOTE — PROGRESS NOTES
Physical Therapy Daily Progress Note    TOTAL TIME: 55 MINUTES    Андрей Ortiz reports: patient reports that he is continuing to feel much better overall; he has much improved ROM of shoulders and cervical spine       Objective   See Exercise, Manual, and Modality Logs for complete treatment.     Manual: cervical mobilizations, sub occ release, traction, STM to left c/s p/s mm    THERAPEUTIC EXERCISES/ACTIVITIES ADDED TODAY: blue tband pull aparts with 3 hand positions ; see previous hep and flow sheets     shoulder pull aparts with 3 different hand positions  cervical flexor endurance in supine , cervical isometrics bilaterally  Blue tband shoulder pulls and rows 2/15 each; blue tband pull aparts, blue tband hor abd 2 x 15 each  Exercises  Supine Chin Tuck - 3 x daily - 7 x weekly - 3 sets - 10 reps  Supine Cervical Rotation AROM on Pillow - 3 x daily - 7 x weekly - 3 sets - 10 reps  Supine Cervical Sidebending Stretch - 3 x daily - 7 x weekly - 1 sets - 10 reps - 10 seconds hold  Supine Shoulder Flexion with Dowel - 3 x daily - 7 x weekly - 3 sets - 10 reps  Supine Shoulder Flexion Overhead with Dowel - 3 x daily - 7 x weekly - 3 sets - 10 reps  Supine Shoulder External Rotation with Dowel - 3 x daily - 7 x weekly - 3 sets - 10 reps  Standing Backward Shoulder Rolls - 3 x daily - 7 x weekly - 3 sets - 10 reps  Seated Scapular Retraction - 3 x daily - 7 x weekly - 3 sets - 10 reps  Standing Shoulder Internal Rotation Stretch with Towel - 3 x daily - 7 x weekly - 3 sets - 10 reps  Standing Shoulder Flexion Stretch on Wall - 3 x daily - 7 x weekly - 3 sets - 10 reps     Assessment/Plan  Patient is doing very well with PT; much improved functional mobility and strength; continue working towards stated goals     Progress per Plan of Care and Progress strengthening /stabilization /functional activity           Manual Therapy:    10     mins  69328;  Therapeutic Exercise:    15     mins  09297;     Neuromuscular Kiana:     15    mins  46732;    Therapeutic Activity:     15     mins  77455;     Gait Training:           mins  23895;     Ultrasound:          mins  88862;    Electrical Stimulation:         mins  83464 ( );  Dry Needling          mins self-pay    Timed Treatment:   55   mins   Total Treatment:     55   mins    KELSI Jackson, PT  Physical Therapist

## 2021-09-16 ENCOUNTER — TREATMENT (OUTPATIENT)
Dept: PHYSICAL THERAPY | Facility: CLINIC | Age: 56
End: 2021-09-16

## 2021-09-16 DIAGNOSIS — M25.512 CHRONIC PAIN OF BOTH SHOULDERS: Primary | ICD-10-CM

## 2021-09-16 DIAGNOSIS — M25.511 CHRONIC PAIN OF BOTH SHOULDERS: Primary | ICD-10-CM

## 2021-09-16 DIAGNOSIS — G89.29 CHRONIC PAIN OF BOTH SHOULDERS: Primary | ICD-10-CM

## 2021-09-16 DIAGNOSIS — M54.2 PAIN, NECK: ICD-10-CM

## 2021-09-16 PROCEDURE — 97530 THERAPEUTIC ACTIVITIES: CPT | Performed by: PHYSICAL THERAPIST

## 2021-09-16 PROCEDURE — 97112 NEUROMUSCULAR REEDUCATION: CPT | Performed by: PHYSICAL THERAPIST

## 2021-09-16 PROCEDURE — 97110 THERAPEUTIC EXERCISES: CPT | Performed by: PHYSICAL THERAPIST

## 2021-09-16 PROCEDURE — 97140 MANUAL THERAPY 1/> REGIONS: CPT | Performed by: PHYSICAL THERAPIST

## 2021-09-16 NOTE — PROGRESS NOTES
Physical Therapy Daily Progress Note    TOTAL TIME: 70 MINUTES    Андрей Ortiz reports: no new complaints; no pain upon arriving to the clinic; feels much improved with shoulder and neck pain and ROM      Objective   See Exercise, Manual, and Modality Logs for complete treatment.     THERAPEUTIC EXERCISES/ACTIVITIES ADDED TODAY: see flow sheets and previous hep ; ball on wall walks ; towel stx using green stretch strap   shoulder pull aparts with 3 different hand positions  cervical flexor endurance in supine , cervical isometrics bilaterally   see flow sheets; blue tband shoulder pulls and rows 2/15 each; blue tband pull aparts, blue tband hor abd 2 x 15 each  Exercises  Supine Chin Tuck - 3 x daily - 7 x weekly - 3 sets - 10 reps  Supine Cervical Rotation AROM on Pillow - 3 x daily - 7 x weekly - 3 sets - 10 reps  Supine Cervical Sidebending Stretch - 3 x daily - 7 x weekly - 1 sets - 10 reps - 10 seconds hold  Supine Shoulder Flexion with Dowel - 3 x daily - 7 x weekly - 3 sets - 10 reps  Supine Shoulder Flexion Overhead with Dowel - 3 x daily - 7 x weekly - 3 sets - 10 reps  Supine Shoulder External Rotation with Dowel - 3 x daily - 7 x weekly - 3 sets - 10 reps  Standing Backward Shoulder Rolls - 3 x daily - 7 x weekly - 3 sets - 10 reps  Seated Scapular Retraction - 3 x daily - 7 x weekly - 3 sets - 10 reps  Standing Shoulder Internal Rotation Stretch with Towel - 3 x daily - 7 x weekly - 3 sets - 10 reps  Standing Shoulder Flexion Stretch on Wall - 3 x daily - 7 x weekly - 3 sets - 10 reps     Manual: cervical mobilizations, sub occ release, traction, STM to left c/s p/s mm     Assessment/Plan  PATIENT NEEDS CONTINUED PHYSICAL THERAPY IN ORDER TO ACHIEVE FULL ROM, STRENGTH AND FUNCTION WITH NO REPORTS OF PAIN IN ORDER TO RTW WITHOUT RESTRICTIONS AND WITHOUT PAIN OR DYSFUNCTION       Progress per Plan of Care and Progress strengthening /stabilization /functional activity           Manual Therapy:    15      mins  29441;  Therapeutic Exercise:    20     mins  36750;     Neuromuscular Kiana:    20    mins  86115;    Therapeutic Activity:     15     mins  83709;     Gait Training:           mins  39537;     Ultrasound:          mins  42521;    Electrical Stimulation:         mins  52798 ( );  Dry Needling          mins self-pay    Timed Treatment:   70   mins   Total Treatment:     70   mins    KELSI Jackson, PT  Physical Therapist

## 2021-09-21 ENCOUNTER — TREATMENT (OUTPATIENT)
Dept: PHYSICAL THERAPY | Facility: CLINIC | Age: 56
End: 2021-09-21

## 2021-09-21 DIAGNOSIS — G89.29 CHRONIC PAIN OF BOTH SHOULDERS: Primary | ICD-10-CM

## 2021-09-21 DIAGNOSIS — M25.512 CHRONIC PAIN OF BOTH SHOULDERS: Primary | ICD-10-CM

## 2021-09-21 DIAGNOSIS — M25.511 CHRONIC PAIN OF BOTH SHOULDERS: Primary | ICD-10-CM

## 2021-09-21 PROCEDURE — 97112 NEUROMUSCULAR REEDUCATION: CPT | Performed by: PHYSICAL THERAPIST

## 2021-09-21 PROCEDURE — 97530 THERAPEUTIC ACTIVITIES: CPT | Performed by: PHYSICAL THERAPIST

## 2021-09-21 NOTE — PROGRESS NOTES
Physical Therapy Daily Progress Note    TOTAL TIME: 30 MINUTES    Андрей Ortiz reports: left shoulder and collar bone is hurting more today because of all of this rain; have had a broken collarbone and scapula while in the [employer] ; patient has to leave early today for another appt      Objective   See Exercise, Manual, and Modality Logs for complete treatment.     THERAPEUTIC EXERCISES/ACTIVITIES ADDED TODAY: see flow sheets   ball on wall walks ; towel stx using green stretch strap   shoulder pull aparts with 3 different hand positions  cervical flexor endurance in supine , cervical isometrics bilaterally   see flow sheets; blue tband shoulder pulls and rows 2/15 each; blue tband pull aparts, blue tband hor abd 2 x 15 each    Exercises  Supine Chin Tuck - 3 x daily - 7 x weekly - 3 sets - 10 reps  Supine Cervical Rotation AROM on Pillow - 3 x daily - 7 x weekly - 3 sets - 10 reps  Supine Cervical Sidebending Stretch - 3 x daily - 7 x weekly - 1 sets - 10 reps - 10 seconds hold  Supine Shoulder Flexion with Dowel - 3 x daily - 7 x weekly - 3 sets - 10 reps  Supine Shoulder Flexion Overhead with Dowel - 3 x daily - 7 x weekly - 3 sets - 10 reps  Supine Shoulder External Rotation with Dowel - 3 x daily - 7 x weekly - 3 sets - 10 reps  Standing Backward Shoulder Rolls - 3 x daily - 7 x weekly - 3 sets - 10 reps  Seated Scapular Retraction - 3 x daily - 7 x weekly - 3 sets - 10 reps  Standing Shoulder Internal Rotation Stretch with Towel - 3 x daily - 7 x weekly - 3 sets - 10 reps  Standing Shoulder Flexion Stretch on Wall - 3 x daily - 7 x weekly - 3 sets - 10 reps     Assessment/Plan  Patient is making good progress with functional mobility and strength ; continue with PT towards functional goals    Progress per Plan of Care and Progress strengthening /stabilization /functional activity           Manual Therapy:         mins  71452;  Therapeutic Exercise:         mins  70824;     Neuromuscular Kiana:    15    mins   43055;    Therapeutic Activity:     15     mins  02847;     Gait Training:           mins  87579;     Ultrasound:          mins  67814;    Electrical Stimulation:         mins  31769 ( );  Dry Needling          mins self-pay    Timed Treatment:   30   mins   Total Treatment:     30   mins    KELSI Jackson, PT  Physical Therapist

## 2021-09-23 ENCOUNTER — TREATMENT (OUTPATIENT)
Dept: PHYSICAL THERAPY | Facility: CLINIC | Age: 56
End: 2021-09-23

## 2021-09-23 DIAGNOSIS — M54.2 PAIN, NECK: ICD-10-CM

## 2021-09-23 DIAGNOSIS — M25.512 CHRONIC PAIN OF BOTH SHOULDERS: Primary | ICD-10-CM

## 2021-09-23 DIAGNOSIS — G89.29 CHRONIC PAIN OF BOTH SHOULDERS: Primary | ICD-10-CM

## 2021-09-23 DIAGNOSIS — M25.511 CHRONIC PAIN OF BOTH SHOULDERS: Primary | ICD-10-CM

## 2021-09-23 PROCEDURE — 97530 THERAPEUTIC ACTIVITIES: CPT | Performed by: PHYSICAL THERAPIST

## 2021-09-23 PROCEDURE — 97110 THERAPEUTIC EXERCISES: CPT | Performed by: PHYSICAL THERAPIST

## 2021-09-23 PROCEDURE — 97112 NEUROMUSCULAR REEDUCATION: CPT | Performed by: PHYSICAL THERAPIST

## 2021-09-23 NOTE — PROGRESS NOTES
Physical Therapy Daily Progress Note    TOTAL TIME: 40 MINUTES    Андрей Ortiz reports: feeling a little better than last session; able to move better       Objective   See Exercise, Manual, and Modality Logs for complete treatment.     THERAPEUTIC EXERCISES/ACTIVITIES ADDED TODAY: see flow sheets and previous hep   ball on wall walks ; towel stx using green stretch strap   shoulder pull aparts with 3 different hand positions  cervical flexor endurance in supine , cervical isometrics bilaterally   see flow sheets; blue tband shoulder pulls and rows 2/15 each; blue tband pull aparts, blue tband hor abd 2 x 15 each     Exercises  Supine Chin Tuck - 3 x daily - 7 x weekly - 3 sets - 10 reps  Supine Cervical Rotation AROM on Pillow - 3 x daily - 7 x weekly - 3 sets - 10 reps  Supine Cervical Sidebending Stretch - 3 x daily - 7 x weekly - 1 sets - 10 reps - 10 seconds hold  Supine Shoulder Flexion with Dowel - 3 x daily - 7 x weekly - 3 sets - 10 reps  Supine Shoulder Flexion Overhead with Dowel - 3 x daily - 7 x weekly - 3 sets - 10 reps  Supine Shoulder External Rotation with Dowel - 3 x daily - 7 x weekly - 3 sets - 10 reps  Standing Backward Shoulder Rolls - 3 x daily - 7 x weekly - 3 sets - 10 reps  Seated Scapular Retraction - 3 x daily - 7 x weekly - 3 sets - 10 reps  Standing Shoulder Internal Rotation Stretch with Towel - 3 x daily - 7 x weekly - 3 sets - 10 reps  Standing Shoulder Flexion Stretch on Wall - 3 x daily - 7 x weekly - 3 sets - 10 reps    Assessment/Plan  Patient is making excellent progress with functional mobility and strength; continue PT towards stated goals     Progress per Plan of Care and Progress strengthening /stabilization /functional activity           Manual Therapy:    5     mins  30667;  Therapeutic Exercise:    15     mins  40542;     Neuromuscular Kiana:    10    mins  01194;    Therapeutic Activity:     10     mins  16239;     Gait Training:           mins  37574;     Ultrasound:           mins  81785;    Electrical Stimulation:         mins  36913 ( );  Dry Needling          mins self-pay    Timed Treatment:   40   mins   Total Treatment:     40   mins    KELSI Jacksno, PT  Physical Therapist

## 2021-09-27 ENCOUNTER — TREATMENT (OUTPATIENT)
Dept: PHYSICAL THERAPY | Facility: CLINIC | Age: 56
End: 2021-09-27

## 2021-09-27 DIAGNOSIS — M54.2 PAIN, NECK: ICD-10-CM

## 2021-09-27 DIAGNOSIS — G89.29 CHRONIC PAIN OF BOTH SHOULDERS: Primary | ICD-10-CM

## 2021-09-27 DIAGNOSIS — M25.511 CHRONIC PAIN OF BOTH SHOULDERS: Primary | ICD-10-CM

## 2021-09-27 DIAGNOSIS — M25.512 CHRONIC PAIN OF BOTH SHOULDERS: Primary | ICD-10-CM

## 2021-09-27 PROCEDURE — 97140 MANUAL THERAPY 1/> REGIONS: CPT | Performed by: PHYSICAL THERAPIST

## 2021-09-27 PROCEDURE — 97110 THERAPEUTIC EXERCISES: CPT | Performed by: PHYSICAL THERAPIST

## 2021-09-27 PROCEDURE — 97112 NEUROMUSCULAR REEDUCATION: CPT | Performed by: PHYSICAL THERAPIST

## 2021-09-27 PROCEDURE — 97530 THERAPEUTIC ACTIVITIES: CPT | Performed by: PHYSICAL THERAPIST

## 2021-09-27 NOTE — PROGRESS NOTES
Physical Therapy Daily Progress Note    TOTAL TIME: 60 MINUTES    Андрей Ortiz reports: feeling much better overall, left side of neck is a little stiff/sore today       Objective   See Exercise, Manual, and Modality Logs for complete treatment.     THERAPEUTIC EXERCISES/ACTIVITIES ADDED TODAY: see previous flowsheets and hep; added levator scapulae stx  ball on wall walks ; towel stx using green stretch strap   shoulder pull aparts with 3 different hand positions  cervical flexor endurance in supine , cervical isometrics bilaterally   see flow sheets; blue tband shoulder pulls and rows 2/15 each; blue tband pull aparts, blue tband hor abd 2 x 15 each     Exercises  Supine Chin Tuck - 3 x daily - 7 x weekly - 3 sets - 10 reps  Supine Cervical Rotation AROM on Pillow - 3 x daily - 7 x weekly - 3 sets - 10 reps  Supine Cervical Sidebending Stretch - 3 x daily - 7 x weekly - 1 sets - 10 reps - 10 seconds hold  Supine Shoulder Flexion with Dowel - 3 x daily - 7 x weekly - 3 sets - 10 reps  Supine Shoulder Flexion Overhead with Dowel - 3 x daily - 7 x weekly - 3 sets - 10 reps  Supine Shoulder External Rotation with Dowel - 3 x daily - 7 x weekly - 3 sets - 10 reps  Standing Backward Shoulder Rolls - 3 x daily - 7 x weekly - 3 sets - 10 reps  Seated Scapular Retraction - 3 x daily - 7 x weekly - 3 sets - 10 reps  Standing Shoulder Internal Rotation Stretch with Towel - 3 x daily - 7 x weekly - 3 sets - 10 reps  Standing Shoulder Flexion Stretch on Wall - 3 x daily - 7 x weekly - 3 sets - 10 re    Manual: cervical mobilizations, traction, lateral glides, PA mobs, upslides x 10 minutes     Assessment/Plan  PATIENT NEEDS CONTINUED PHYSICAL THERAPY IN ORDER TO ACHIEVE FULL ROM, STRENGTH AND FUNCTION WITH NO REPORTS OF PAIN IN ORDER TO RTW WITHOUT RESTRICTIONS AND WITHOUT PAIN OR DYSFUNCTION       Progress per Plan of Care and Progress strengthening /stabilization /functional activity           Manual Therapy:    10     mins   14481;  Therapeutic Exercise:    20     mins  22491;     Neuromuscular Kiana:    15    mins  59776;    Therapeutic Activity:     15     mins  53992;     Gait Training:           mins  25122;     Ultrasound:          mins  97744;    Electrical Stimulation:         mins  86300 ( );  Dry Needling          mins self-pay    Timed Treatment:   60   mins   Total Treatment:     60   mins    KELSI Jackson PT  Physical Therapist

## 2021-10-05 ENCOUNTER — TREATMENT (OUTPATIENT)
Dept: PHYSICAL THERAPY | Facility: CLINIC | Age: 56
End: 2021-10-05

## 2021-10-05 DIAGNOSIS — M54.2 PAIN, NECK: ICD-10-CM

## 2021-10-05 DIAGNOSIS — M25.512 CHRONIC PAIN OF BOTH SHOULDERS: Primary | ICD-10-CM

## 2021-10-05 DIAGNOSIS — G89.29 CHRONIC PAIN OF BOTH SHOULDERS: Primary | ICD-10-CM

## 2021-10-05 DIAGNOSIS — M25.511 CHRONIC PAIN OF BOTH SHOULDERS: Primary | ICD-10-CM

## 2021-10-05 PROCEDURE — 97110 THERAPEUTIC EXERCISES: CPT | Performed by: PHYSICAL THERAPIST

## 2021-10-05 PROCEDURE — 97112 NEUROMUSCULAR REEDUCATION: CPT | Performed by: PHYSICAL THERAPIST

## 2021-10-05 PROCEDURE — 97530 THERAPEUTIC ACTIVITIES: CPT | Performed by: PHYSICAL THERAPIST

## 2021-10-05 NOTE — PROGRESS NOTES
Physical Therapy Daily Progress Note    TOTAL TIME: 55 MINUTES    Андрей Ortiz reports: feeling much better overall ; left side of neck and shoulder sore intermittently      Objective   See Exercise, Manual, and Modality Logs for complete treatment.     THERAPEUTIC EXERCISES/ACTIVITIES ADDED TODAY: see flow sheets   added levator scapulae stx  ball on wall walks ; towel stx using green stretch strap   shoulder pull aparts with 3 different hand positions  cervical flexor endurance in supine , cervical isometrics bilaterally   see flow sheets; blue tband shoulder pulls and rows 2/15 each; blue tband pull aparts, blue tband hor abd 2 x 15 each     Exercises  Supine Chin Tuck - 3 x daily - 7 x weekly - 3 sets - 10 reps  Supine Cervical Rotation AROM on Pillow - 3 x daily - 7 x weekly - 3 sets - 10 reps  Supine Cervical Sidebending Stretch - 3 x daily - 7 x weekly - 1 sets - 10 reps - 10 seconds hold  Supine Shoulder Flexion with Dowel - 3 x daily - 7 x weekly - 3 sets - 10 reps  Supine Shoulder Flexion Overhead with Dowel - 3 x daily - 7 x weekly - 3 sets - 10 reps  Supine Shoulder External Rotation with Dowel - 3 x daily - 7 x weekly - 3 sets - 10 reps  Standing Backward Shoulder Rolls - 3 x daily - 7 x weekly - 3 sets - 10 reps  Seated Scapular Retraction - 3 x daily - 7 x weekly - 3 sets - 10 reps  Standing Shoulder Internal Rotation Stretch with Towel - 3 x daily - 7 x weekly - 3 sets - 10 reps  Standing Shoulder Flexion Stretch on Wall - 3 x daily - 7 x weekly - 3 sets - 10     Assessment/Plan  PATIENT NEEDS CONTINUED PHYSICAL THERAPY IN ORDER TO ACHIEVE FULL ROM, STRENGTH AND FUNCTION WITH NO REPORTS OF PAIN IN ORDER TO RTW WITHOUT RESTRICTIONS AND WITHOUT PAIN OR DYSFUNCTION       Progress per Plan of Care and Progress strengthening /stabilization /functional activity           Manual Therapy:         mins  33328;  Therapeutic Exercise:    25     mins  29474;     Neuromuscular Kiana:    15    mins  55714;     Therapeutic Activity:     15     mins  98003;     Gait Training:           mins  35450;     Ultrasound:          mins  63991;    Electrical Stimulation:         mins  69216 ( );  Dry Needling          mins self-pay    Timed Treatment:   55   mins   Total Treatment:     55   mins    KELSI Jackson, PT  Physical Therapist

## 2021-10-06 PROCEDURE — U0004 COV-19 TEST NON-CDC HGH THRU: HCPCS | Performed by: NURSE PRACTITIONER

## 2021-10-07 ENCOUNTER — TREATMENT (OUTPATIENT)
Dept: PHYSICAL THERAPY | Facility: CLINIC | Age: 56
End: 2021-10-07

## 2021-10-07 DIAGNOSIS — M25.512 CHRONIC PAIN OF BOTH SHOULDERS: Primary | ICD-10-CM

## 2021-10-07 DIAGNOSIS — M25.511 CHRONIC PAIN OF BOTH SHOULDERS: Primary | ICD-10-CM

## 2021-10-07 DIAGNOSIS — M54.2 PAIN, NECK: ICD-10-CM

## 2021-10-07 DIAGNOSIS — G89.29 CHRONIC PAIN OF BOTH SHOULDERS: Primary | ICD-10-CM

## 2021-10-07 PROCEDURE — 97530 THERAPEUTIC ACTIVITIES: CPT | Performed by: PHYSICAL THERAPIST

## 2021-10-07 PROCEDURE — 97112 NEUROMUSCULAR REEDUCATION: CPT | Performed by: PHYSICAL THERAPIST

## 2021-10-07 PROCEDURE — 97110 THERAPEUTIC EXERCISES: CPT | Performed by: PHYSICAL THERAPIST

## 2021-10-07 NOTE — PROGRESS NOTES
Physical Therapy Daily Progress Note    TOTAL TIME: 55 MINUTES    Андрей Ortiz reports: no new complaints; feeling good; neck and shoulders are improved overall       Objective   See Exercise, Manual, and Modality Logs for complete treatment.     THERAPEUTIC EXERCISES/ACTIVITIES ADDED TODAY: see previous hep and flow sheets ; green tband scaption bilaterally   towel stx using green stretch strap   shoulder pull aparts with 3 different hand positions  cervical flexor endurance in supine , cervical isometrics bilaterally   see flow sheets; blue tband shoulder pulls and rows 2/15 each; blue tband pull aparts, blue tband hor abd 2 x 15 each     Exercises  Supine Chin Tuck - 3 x daily - 7 x weekly - 3 sets - 10 reps  Supine Cervical Rotation AROM on Pillow - 3 x daily - 7 x weekly - 3 sets - 10 reps  Supine Cervical Sidebending Stretch - 3 x daily - 7 x weekly - 1 sets - 10 reps - 10 seconds hold  Supine Shoulder Flexion with Dowel - 3 x daily - 7 x weekly - 3 sets - 10 reps  Supine Shoulder Flexion Overhead with Dowel - 3 x daily - 7 x weekly - 3 sets - 10 reps  Supine Shoulder External Rotation with Dowel - 3 x daily - 7 x weekly - 3 sets - 10 reps  Standing Backward Shoulder Rolls - 3 x daily - 7 x weekly - 3 sets - 10 reps  Seated Scapular Retraction - 3 x daily - 7 x weekly - 3 sets - 10 reps  Standing Shoulder Internal Rotation Stretch with Towel - 3 x daily - 7 x weekly - 3 sets - 10 reps  Standing Shoulder Flexion Stretch on Wall - 3 x daily - 7 x weekly - 3 sets - 10 re    Assessment/Plan  PATIENT NEEDS CONTINUED PHYSICAL THERAPY IN ORDER TO ACHIEVE FULL ROM, STRENGTH AND FUNCTION WITH NO REPORTS OF PAIN IN ORDER TO RTW WITHOUT RESTRICTIONS AND WITHOUT PAIN OR DYSFUNCTION       Progress per Plan of Care and Progress strengthening /stabilization /functional activity           Manual Therapy:         mins  25010;  Therapeutic Exercise:    30     mins  24027;     Neuromuscular Kiana:    15    mins  36558;     Therapeutic Activity:     10     mins  39079;     Gait Training:           mins  96853;     Ultrasound:          mins  60603;    Electrical Stimulation:         mins  96566 ( );  Dry Needling          mins self-pay    Timed Treatment:   55   mins   Total Treatment:     55   mins    KELSI Jackson, PT  Physical Therapist

## 2021-12-06 ENCOUNTER — HOSPITAL ENCOUNTER (EMERGENCY)
Facility: HOSPITAL | Age: 56
Discharge: HOME | End: 2021-12-06
Attending: EMERGENCY MEDICINE | Admitting: EMERGENCY MEDICINE

## 2021-12-06 VITALS
RESPIRATION RATE: 20 BRPM | HEART RATE: 100 BPM | WEIGHT: 157 LBS | SYSTOLIC BLOOD PRESSURE: 161 MMHG | BODY MASS INDEX: 28.89 KG/M2 | TEMPERATURE: 97.8 F | DIASTOLIC BLOOD PRESSURE: 80 MMHG | OXYGEN SATURATION: 95 % | HEIGHT: 62 IN

## 2021-12-06 DIAGNOSIS — H10.9 CONJUNCTIVITIS OF LEFT EYE, UNSPECIFIED CONJUNCTIVITIS TYPE: Primary | ICD-10-CM

## 2021-12-06 PROCEDURE — 99283 EMERGENCY DEPT VISIT LOW MDM: CPT

## 2021-12-06 RX ORDER — TETRACAINE HYDROCHLORIDE 5 MG/ML
2 SOLUTION OPHTHALMIC ONCE
Status: COMPLETED | OUTPATIENT
Start: 2021-12-06 | End: 2021-12-06

## 2021-12-06 RX ORDER — GENTAMICIN SULFATE 3 MG/ML
1 SOLUTION/ DROPS OPHTHALMIC EVERY 4 HOURS
Qty: 5 ML | Refills: 0 | Status: SHIPPED | OUTPATIENT
Start: 2021-12-06 | End: 2021-12-13

## 2021-12-06 RX ADMIN — TETRACAINE HYDROCHLORIDE 2 DROP: 5 SOLUTION OPHTHALMIC at 04:12

## 2021-12-06 NOTE — ED PROVIDER NOTES
Subjective   56-year-old male presents to the ED with a chief complaint of left eye irritation.  Patient states that yesterday he had some sinus congestion and today has developed a left eye redness, increased tearing, itching and burning and purulent drainage.  Slightly worsened tonight while at work.  No trauma or injury.  No fever chills.  No prior treatments or limiting factors.  No change in vision.  No other complaints at this time.          Review of Systems   Eyes: Positive for discharge, redness and itching.   All other systems reviewed and are negative.      Past Medical History:   Diagnosis Date   • Arthritis    • Collar bone fracture    • Head injury due to trauma    • Hypertension    • Shoulder fracture, left        No Known Allergies    History reviewed. No pertinent surgical history.    History reviewed. No pertinent family history.    Social History     Socioeconomic History   • Marital status: Single   Tobacco Use   • Smoking status: Never Smoker   • Smokeless tobacco: Never Used   Substance and Sexual Activity   • Alcohol use: No   • Drug use: No           Objective   Physical Exam  Vitals and nursing note reviewed.   Constitutional:       General: He is not in acute distress.     Appearance: He is well-developed. He is not diaphoretic.   HENT:      Head: Normocephalic and atraumatic.      Comments: Left eye conjunctival injection, increased tearing     Nose: Nose normal.   Eyes:      Conjunctiva/sclera: Conjunctivae normal.      Pupils: Pupils are equal, round, and reactive to light.   Cardiovascular:      Rate and Rhythm: Normal rate and regular rhythm.   Pulmonary:      Effort: Pulmonary effort is normal. No respiratory distress.      Breath sounds: Normal breath sounds.   Abdominal:      General: There is no distension.      Palpations: Abdomen is soft.      Tenderness: There is no abdominal tenderness.   Musculoskeletal:         General: No deformity.   Neurological:      Mental Status: He is  alert and oriented to person, place, and time.      Cranial Nerves: No cranial nerve deficit.      Coordination: Coordination normal.         Procedures           ED Course                                                 MDM  Exam is consistent with conjunctivitis of his left eye.  Suspect most likely viral given his recent URI type symptoms but will give him prescription for appropriate antibiotic drops as he indicated that he has had purulent drainage from left eye.      Final diagnoses:   Conjunctivitis of left eye, unspecified conjunctivitis type       ED Disposition  ED Disposition     ED Disposition Condition Comment    Discharge Stable           PATIENT [facility] [place] [address]  [phone]        [place] [address]  [phone]  Schedule an appointment as soon as possible for a visit            Medication List      New Prescriptions    gentamicin 0.3 % ophthalmic solution  Commonly known as: GARAMYCIN  Administer 1 drop to the right eye Every 4 (Four) Hours for 7 days.           Where to Get Your Medications      These medications were sent to [facility] [address] [place] [address] - [address] [place] [address] - [phone]  - [phone] [address] [address]    Phone: [phone]   · gentamicin 0.3 % ophthalmic solution          Lenny Rodríguez, DO  12/06/21 0332

## 2022-01-19 ENCOUNTER — OFFICE VISIT (OUTPATIENT)
Dept: FAMILY MEDICINE CLINIC | Facility: CLINIC | Age: 57
End: 2022-01-19

## 2022-01-19 VITALS
OXYGEN SATURATION: 98 % | HEART RATE: 67 BPM | DIASTOLIC BLOOD PRESSURE: 96 MMHG | BODY MASS INDEX: 29.88 KG/M2 | WEIGHT: 168.6 LBS | HEIGHT: 63 IN | TEMPERATURE: 98.2 F | SYSTOLIC BLOOD PRESSURE: 158 MMHG

## 2022-01-19 DIAGNOSIS — K02.9 PAIN DUE TO DENTAL CARIES: Primary | ICD-10-CM

## 2022-01-19 PROCEDURE — 99202 OFFICE O/P NEW SF 15 MIN: CPT

## 2022-01-19 RX ORDER — AMOXICILLIN 500 MG/1
500 CAPSULE ORAL 3 TIMES DAILY
Qty: 42 CAPSULE | Refills: 0 | Status: SHIPPED | OUTPATIENT
Start: 2022-01-19 | End: 2022-02-02

## 2022-01-19 RX ORDER — MELOXICAM 15 MG/1
15 TABLET ORAL DAILY
Qty: 14 TABLET | Refills: 0 | Status: SHIPPED | OUTPATIENT
Start: 2022-01-19

## 2022-01-19 RX ORDER — LIDOCAINE HYDROCHLORIDE 10 MG/ML
5 INJECTION, SOLUTION INFILTRATION; PERINEURAL ONCE
Status: COMPLETED | OUTPATIENT
Start: 2022-01-19 | End: 2022-01-19

## 2022-01-19 RX ADMIN — LIDOCAINE HYDROCHLORIDE 5 ML: 10 INJECTION, SOLUTION INFILTRATION; PERINEURAL at 18:27

## 2022-01-19 NOTE — PROGRESS NOTES
Acute Office Visit      Patient Name: Андрей Ortiz  : 1965   MRN: [id number]     Chief Complaint:    Chief Complaint   Patient presents with   • Dental Pain     x1 week; bottom left side; decayed tooth; in process of having all teeth removed and getting dentures       History of Present Illness: Андрей Ortiz is a 56 y.o. male who is here today for dental pain.  Patient states he has been dealing with getting his teeth pulled at separate times to ultimately get dentures.  He is currently been dealing with left lower dental pain for 5 days.  Has noted some little drainage but is having swelling of the gums around the tooth.  Is very sensitive to touch and temperatures.    Subjective     Review of System: Review of Systems   Constitutional: Negative for chills and fever.   HENT: Positive for dental problem.    Neurological: Negative for headaches.      I have reviewed the ROS documented by my clinical staff, updated appropriately and I agree. MICHELLE Gutiérrez    Past Medical History:   Past Medical History:   Diagnosis Date   • Arthritis    • Collar bone fracture    • Head injury due to trauma    • Hypertension    • Shoulder fracture, left        Past Surgical History: History reviewed. No pertinent surgical history.    Family History: History reviewed. No pertinent family history.    Social History:   Social History     Socioeconomic History   • Marital status: Single   Tobacco Use   • Smoking status: Never Smoker   • Smokeless tobacco: Never Used   Substance and Sexual Activity   • Alcohol use: No   • Drug use: No       Medications:     Current Outpatient Medications:   •  naproxen (NAPROSYN) 500 MG tablet, Take 500 mg by mouth 2 (Two) Times a Day With Meals., Disp: , Rfl:   •  amoxicillin (AMOXIL) 500 MG capsule, Take 1 capsule by mouth 3 (Three) Times a Day for 14 days., Disp: 42 capsule, Rfl: 0  •  meloxicam (MOBIC) 15 MG tablet, Take 1 tablet by mouth Daily., Disp: 14 tablet, Rfl:  "0    Allergies:   No Known Allergies    Objective     Physical Exam:   Vital Signs:   Vitals:    01/19/22 1737   BP: 158/96   Pulse: 67   Temp: 98.2 °F (36.8 °C)   SpO2: 98%   Weight: 76.5 kg (168 lb 9.6 oz)   Height: 160 cm (63\")     Body mass index is 29.87 kg/m².       Physical Exam  Vitals and nursing note reviewed.   Constitutional:       Appearance: Normal appearance.   HENT:      Head: Normocephalic and atraumatic.      Mouth/Throat:      Mouth: Mucous membranes are moist.      Dentition: Dental tenderness, gingival swelling and dental caries present.      Pharynx: Oropharynx is clear. Uvula midline.      Comments: Patient does not have any surrounding teeth to his lower left first molar.  Dental caries noted to this tooth and is partially broken.  Pulmonary:      Effort: Pulmonary effort is normal.   Skin:     Capillary Refill: Capillary refill takes less than 2 seconds.   Neurological:      Mental Status: He is alert.       Nerve Block    Date/Time: 1/21/2022 1:48 PM  Performed by: Juan Jose Hernandez APRN  Authorized by: Juan Jose Hernandez APRN   Indications: pain relief  Body area: face/mouth  Nerve: inferior alveolar  Laterality: left    Sedation:  Patient sedated: no    Patient position: sitting  Needle size: 25 G  Location technique: anatomical landmarks  Local Anesthetic: lidocaine 1% without epinephrine  Anesthetic total: 5 mL  Outcome: pain improved  Patient tolerance: patient tolerated the procedure well with no immediate complications          Assessment / Plan      Assessment/Plan:   Diagnoses and all orders for this visit:    1. Pain due to dental caries (Primary)  -     lidocaine (XYLOCAINE) 1 % injection 5 mL  -     meloxicam (MOBIC) 15 MG tablet; Take 1 tablet by mouth Daily.  Dispense: 14 tablet; Refill: 0  -     amoxicillin (AMOXIL) 500 MG capsule; Take 1 capsule by mouth 3 (Three) Times a Day for 14 days.  Dispense: 42 capsule; Refill: 0  -     Nerve Block         Discussed treatment options with " patient.  He is agreeable to a cool nerve block.  He tolerated the procedure and had immediate pain relief.  Medication sent to pharmacy for amoxicillin (patient preference) and meloxicam.  Take as prescribed.      Follow Up:   Return if symptoms worsen or fail to improve.    I spent approximately 15 minutes providing clinical care for this patient; including review of patient's chart and provider documentation, face to face time spent with patient in examination room (obtaining history, performing physical exam, discussing diagnosis and management options), placing orders, and completing patient documentation.     MICHELLE Gutiérrez  [facility] ANTONIO Roman

## 2022-01-21 PROCEDURE — 64450 NJX AA&/STRD OTHER PN/BRANCH: CPT

## 2022-06-11 PROCEDURE — 99284 EMERGENCY DEPT VISIT MOD MDM: CPT

## 2022-06-12 ENCOUNTER — HOSPITAL ENCOUNTER (EMERGENCY)
Facility: HOSPITAL | Age: 57
Discharge: HOME | End: 2022-06-12
Attending: EMERGENCY MEDICINE | Admitting: EMERGENCY MEDICINE

## 2022-06-12 VITALS
RESPIRATION RATE: 18 BRPM | BODY MASS INDEX: 29.59 KG/M2 | OXYGEN SATURATION: 99 % | SYSTOLIC BLOOD PRESSURE: 122 MMHG | HEART RATE: 88 BPM | TEMPERATURE: 97.7 F | DIASTOLIC BLOOD PRESSURE: 80 MMHG | HEIGHT: 63 IN | WEIGHT: 167 LBS

## 2022-06-12 DIAGNOSIS — R11.0 NAUSEA: ICD-10-CM

## 2022-06-12 DIAGNOSIS — K04.7 DENTAL INFECTION: Primary | ICD-10-CM

## 2022-06-12 PROCEDURE — 63710000001 ONDANSETRON ODT 4 MG TABLET DISPERSIBLE: Performed by: EMERGENCY MEDICINE

## 2022-06-12 RX ORDER — AMOXICILLIN AND CLAVULANATE POTASSIUM 875; 125 MG/1; MG/1
1 TABLET, FILM COATED ORAL ONCE
Status: COMPLETED | OUTPATIENT
Start: 2022-06-12 | End: 2022-06-12

## 2022-06-12 RX ORDER — AMOXICILLIN AND CLAVULANATE POTASSIUM 875; 125 MG/1; MG/1
1 TABLET, FILM COATED ORAL 2 TIMES DAILY
Qty: 14 TABLET | Refills: 0 | Status: SHIPPED | OUTPATIENT
Start: 2022-06-12 | End: 2022-06-19

## 2022-06-12 RX ORDER — ONDANSETRON 4 MG/1
4 TABLET, ORALLY DISINTEGRATING ORAL ONCE
Status: COMPLETED | OUTPATIENT
Start: 2022-06-12 | End: 2022-06-12

## 2022-06-12 RX ORDER — ONDANSETRON 4 MG/1
4 TABLET, ORALLY DISINTEGRATING ORAL EVERY 6 HOURS PRN
Qty: 10 TABLET | Refills: 0 | Status: SHIPPED | OUTPATIENT
Start: 2022-06-12

## 2022-06-12 RX ADMIN — AMOXICILLIN AND CLAVULANATE POTASSIUM 1 TABLET: 875; 125 TABLET, FILM COATED ORAL at 04:10

## 2022-06-12 RX ADMIN — ONDANSETRON 4 MG: 4 TABLET, ORALLY DISINTEGRATING ORAL at 04:10

## 2022-06-12 NOTE — ED PROVIDER NOTES
Subjective   57-year-old male presenting with 2 complaints.  His first complaint is 2 to 3 days of left lower toothache.  Thinks he has an abscess.  Has had pain and swelling around the tooth.  Second complaint is upset stomach.  States that he was at work prior to arrival and felt nauseous.  His employer made him leave and advised him to bring in a work note so he came here for evaluation.  Symptoms have all resolved at this time.  He denies any fevers, vomiting, diarrhea, abdominal pain, chest pain, cough.          Review of Systems   Constitutional: Negative.    HENT: Positive for dental problem.    Eyes: Negative.    Respiratory: Negative.    Cardiovascular: Negative.    Gastrointestinal: Positive for nausea. Negative for abdominal pain, diarrhea and vomiting.   Genitourinary: Negative.    Musculoskeletal: Negative.    Skin: Negative.    Neurological: Negative.    Psychiatric/Behavioral: Negative.        Past Medical History:   Diagnosis Date   • Arthritis    • Collar bone fracture    • Head injury due to trauma    • Hypertension    • Shoulder fracture, left        No Known Allergies    History reviewed. No pertinent surgical history.    History reviewed. No pertinent family history.    Social History     Socioeconomic History   • Marital status: Single   Tobacco Use   • Smoking status: Never Smoker   • Smokeless tobacco: Never Used   Substance and Sexual Activity   • Alcohol use: No   • Drug use: No           Objective   Physical Exam  Vitals reviewed.   Constitutional:       General: He is not in acute distress.     Appearance: Normal appearance. He is not ill-appearing, toxic-appearing or diaphoretic.   HENT:      Head: Normocephalic and atraumatic.      Comments: Left lower tooth with significant decay and tenderness, no fluctuance surrounding     Right Ear: External ear normal.      Left Ear: External ear normal.      Nose: Nose normal.      Mouth/Throat:      Mouth: Mucous membranes are moist.       Pharynx: Oropharynx is clear.   Eyes:      Extraocular Movements: Extraocular movements intact.      Conjunctiva/sclera: Conjunctivae normal.      Pupils: Pupils are equal, round, and reactive to light.   Cardiovascular:      Rate and Rhythm: Normal rate and regular rhythm.      Pulses: Normal pulses.      Heart sounds: Normal heart sounds.   Pulmonary:      Effort: Pulmonary effort is normal. No respiratory distress.      Breath sounds: Normal breath sounds.   Abdominal:      General: Bowel sounds are normal. There is no distension.      Tenderness: There is no abdominal tenderness.   Musculoskeletal:         General: No swelling, tenderness or deformity. Normal range of motion.      Cervical back: Normal range of motion and neck supple.   Skin:     General: Skin is warm and dry.      Capillary Refill: Capillary refill takes less than 2 seconds.      Findings: No rash.   Neurological:      General: No focal deficit present.      Mental Status: He is alert and oriented to person, place, and time.   Psychiatric:         Mood and Affect: Mood normal.         Behavior: Behavior normal.         Procedures           ED Course                                                 MDM  Number of Diagnoses or Management Options  Dental infection  Nausea  Diagnosis management comments: 57-year-old male with toothache and nausea.  Well-developed, well-nourished man in no distress with exam as above.  His vital signs are normal.  He does have evidence of dental infection for which we will start him on some antibiotics.  His abdominal exam is benign.  We will give Zofran.  Do not feel imaging or labs are indicated at this point.  Will discharge home with outpatient follow-up.  He is happy with this plan.    DDx: Dental infection, gastroenteritis, viral illness      Final diagnoses:   Dental infection   Nausea          Ruperto Chen MD  06/12/22 8077

## 2022-08-27 ENCOUNTER — HOSPITAL ENCOUNTER (EMERGENCY)
Facility: HOSPITAL | Age: 57
Discharge: HOME | End: 2022-08-27
Attending: EMERGENCY MEDICINE | Admitting: EMERGENCY MEDICINE

## 2022-08-27 VITALS
SYSTOLIC BLOOD PRESSURE: 157 MMHG | BODY MASS INDEX: 29.77 KG/M2 | RESPIRATION RATE: 18 BRPM | OXYGEN SATURATION: 97 % | HEIGHT: 63 IN | DIASTOLIC BLOOD PRESSURE: 89 MMHG | TEMPERATURE: 98.4 F | HEART RATE: 75 BPM | WEIGHT: 168 LBS

## 2022-08-27 DIAGNOSIS — K04.7 DENTAL INFECTION: Primary | ICD-10-CM

## 2022-08-27 PROCEDURE — 99282 EMERGENCY DEPT VISIT SF MDM: CPT

## 2022-08-27 RX ORDER — AMOXICILLIN AND CLAVULANATE POTASSIUM 875; 125 MG/1; MG/1
1 TABLET, FILM COATED ORAL 2 TIMES DAILY
Qty: 20 TABLET | Refills: 0 | Status: SHIPPED | OUTPATIENT
Start: 2022-08-27 | End: 2022-09-06

## 2022-11-20 ENCOUNTER — HOSPITAL ENCOUNTER (EMERGENCY)
Facility: HOSPITAL | Age: 57
Discharge: HOME | End: 2022-11-20
Attending: FAMILY MEDICINE | Admitting: FAMILY MEDICINE

## 2022-11-20 VITALS
BODY MASS INDEX: 29.3 KG/M2 | TEMPERATURE: 97.7 F | SYSTOLIC BLOOD PRESSURE: 157 MMHG | OXYGEN SATURATION: 98 % | RESPIRATION RATE: 16 BRPM | HEART RATE: 65 BPM | DIASTOLIC BLOOD PRESSURE: 90 MMHG | WEIGHT: 165.4 LBS | HEIGHT: 63 IN

## 2022-11-20 DIAGNOSIS — T23.261A PARTIAL THICKNESS BURN OF BACK OF RIGHT HAND, INITIAL ENCOUNTER: Primary | ICD-10-CM

## 2022-11-20 PROCEDURE — 99282 EMERGENCY DEPT VISIT SF MDM: CPT

## 2022-11-20 PROCEDURE — 96372 THER/PROPH/DIAG INJ SC/IM: CPT

## 2022-11-20 PROCEDURE — 25010000002 KETOROLAC TROMETHAMINE PER 15 MG: Performed by: PHYSICIAN ASSISTANT

## 2022-11-20 RX ORDER — KETOROLAC TROMETHAMINE 10 MG/1
10 TABLET, FILM COATED ORAL EVERY 6 HOURS PRN
Qty: 15 TABLET | Refills: 0 | Status: SHIPPED | OUTPATIENT
Start: 2022-11-20

## 2022-11-20 RX ORDER — KETOROLAC TROMETHAMINE 30 MG/ML
30 INJECTION, SOLUTION INTRAMUSCULAR; INTRAVENOUS ONCE
Status: COMPLETED | OUTPATIENT
Start: 2022-11-20 | End: 2022-11-20

## 2022-11-20 RX ADMIN — SILVER SULFADIAZINE 1 APPLICATION: 10 CREAM TOPICAL at 11:08

## 2022-11-20 RX ADMIN — KETOROLAC TROMETHAMINE 30 MG: 30 INJECTION, SOLUTION INTRAMUSCULAR; INTRAVENOUS at 11:07

## 2022-11-20 NOTE — ED PROVIDER NOTES
Subjective   History of Present Illness  This is a 57-year-old male who presents to the emergency department chief complaint grease burn x1 day ago.  Patient states he was working at [employer] when grease splashed hitting his right hand.  Patient denies any other associated injuries at this time.  Does state his tetanus is up-to-date for age.    History provided by:  Patient   used: No    Burn  Burn location:  Hand  Hand burn location:  R hand  Time since incident:  1 day  Progression:  Worsening  Incident location:  Home  Relieved by:  Nothing  Worsened by:  Nothing  Ineffective treatments:  None tried  Associated symptoms: no cough, no difficulty swallowing, no eye pain and no shortness of breath    Tetanus status:  Unknown      Review of Systems   Constitutional: Negative.  Negative for appetite change, chills and diaphoresis.   HENT: Negative.  Negative for dental problem, drooling, ear discharge, ear pain, mouth sores, nosebleeds and trouble swallowing.    Eyes: Negative.  Negative for pain.   Respiratory: Negative.  Negative for apnea, cough and shortness of breath.    Cardiovascular: Negative.  Negative for chest pain, palpitations and leg swelling.   Gastrointestinal: Negative.  Negative for abdominal pain, anal bleeding, blood in stool, constipation and diarrhea.   Endocrine: Negative.  Negative for heat intolerance, polydipsia and polyphagia.   Genitourinary: Negative.  Negative for enuresis, flank pain, frequency, genital sores and penile pain.   Musculoskeletal: Negative.  Negative for back pain, gait problem, joint swelling and myalgias.   Skin: Positive for rash and wound. Negative for color change and pallor.   Neurological: Negative.  Negative for dizziness, seizures, light-headedness and headaches.   Hematological: Negative.  Negative for adenopathy. Does not bruise/bleed easily.   Psychiatric/Behavioral: Negative.  Negative for behavioral problems, confusion, decreased  concentration, dysphoric mood and hallucinations. The patient is not hyperactive.    All other systems reviewed and are negative.      Past Medical History:   Diagnosis Date   • Arthritis    • Collar bone fracture    • Head injury due to trauma    • Hypertension    • Shoulder fracture, left        No Known Allergies    History reviewed. No pertinent surgical history.    History reviewed. No pertinent family history.    Social History     Socioeconomic History   • Marital status: Single   Tobacco Use   • Smoking status: Never   • Smokeless tobacco: Never   Vaping Use   • Vaping Use: Never used   Substance and Sexual Activity   • Alcohol use: No   • Drug use: No   • Sexual activity: Defer           Objective   Physical Exam  Vitals and nursing note reviewed.   Constitutional:       General: He is not in acute distress.     Appearance: Normal appearance. He is normal weight. He is not ill-appearing, toxic-appearing or diaphoretic.   HENT:      Head: Normocephalic and atraumatic.      Right Ear: Tympanic membrane, ear canal and external ear normal. There is no impacted cerumen.      Left Ear: Tympanic membrane, ear canal and external ear normal. There is no impacted cerumen.      Nose: Nose normal. No congestion or rhinorrhea.      Mouth/Throat:      Mouth: Mucous membranes are moist.      Pharynx: Oropharynx is clear. No oropharyngeal exudate or posterior oropharyngeal erythema.   Eyes:      General: No scleral icterus.        Right eye: No discharge.         Left eye: No discharge.      Extraocular Movements: Extraocular movements intact.      Conjunctiva/sclera: Conjunctivae normal.      Pupils: Pupils are equal, round, and reactive to light.   Cardiovascular:      Rate and Rhythm: Normal rate and regular rhythm.      Pulses: Normal pulses.      Heart sounds: Normal heart sounds. No murmur heard.    No friction rub. No gallop.   Pulmonary:      Effort: Pulmonary effort is normal. No respiratory distress.      Breath  sounds: Normal breath sounds. No stridor. No wheezing, rhonchi or rales.   Chest:      Chest wall: No tenderness.   Abdominal:      General: Abdomen is flat. There is no distension.      Palpations: Abdomen is soft. There is no mass.      Tenderness: There is no abdominal tenderness. There is no right CVA tenderness, left CVA tenderness, guarding or rebound.      Hernia: No hernia is present.   Musculoskeletal:         General: Tenderness present. No swelling, deformity or signs of injury. Normal range of motion.      Cervical back: Normal range of motion and neck supple. No rigidity or tenderness.      Right lower leg: No edema.      Left lower leg: No edema.      Comments: Erythematous, blisters to right hand.  Moderate swelling.  Neurovascular intact distal to burn.   Lymphadenopathy:      Cervical: No cervical adenopathy.   Skin:     General: Skin is warm.      Coloration: Skin is not jaundiced or pale.      Findings: No bruising, erythema or lesion.   Neurological:      General: No focal deficit present.      Mental Status: He is alert and oriented to person, place, and time. Mental status is at baseline.      Cranial Nerves: No cranial nerve deficit.      Sensory: No sensory deficit.      Motor: No weakness.      Coordination: Coordination normal.      Gait: Gait normal.      Deep Tendon Reflexes: Reflexes normal.   Psychiatric:         Mood and Affect: Mood normal.         Behavior: Behavior normal.         Thought Content: Thought content normal.         Judgment: Judgment normal.         Procedures           ED Course                                           MDM    Final diagnoses:   Partial thickness burn of back of right hand, initial encounter       ED Disposition  ED Disposition     ED Disposition   Discharge    Condition   Stable    Comment   --             No follow-up provider specified.       Medication List      New Prescriptions    ketorolac 10 MG tablet  Commonly known as: TORADOL  Take 1 tablet  by mouth Every 6 (Six) Hours As Needed for Moderate Pain.     silver sulfadiazine 1 % cream  Commonly known as: SILVADENE, SSD  Apply 1 application topically to the appropriate area as directed 2 (Two) Times a Day.           Where to Get Your Medications      These medications were sent to [facility] [address] [place] [address] - [address] [facility] - [phone]  - [phone]   [address] [facility] [address]    Phone: [phone]   · ketorolac 10 MG tablet  · silver sulfadiazine 1 % cream          Rishabh Barragan PA-C  11/20/22 1980

## 2023-03-15 ENCOUNTER — HOSPITAL ENCOUNTER (EMERGENCY)
Facility: HOSPITAL | Age: 58
Discharge: HOME | End: 2023-03-15
Attending: EMERGENCY MEDICINE | Admitting: EMERGENCY MEDICINE
Payer: OTHER GOVERNMENT

## 2023-03-15 VITALS
TEMPERATURE: 97.5 F | OXYGEN SATURATION: 98 % | BODY MASS INDEX: 29.77 KG/M2 | WEIGHT: 168 LBS | HEART RATE: 82 BPM | RESPIRATION RATE: 16 BRPM | SYSTOLIC BLOOD PRESSURE: 163 MMHG | DIASTOLIC BLOOD PRESSURE: 84 MMHG | HEIGHT: 63 IN

## 2023-03-15 DIAGNOSIS — J06.9 VIRAL URI: Primary | ICD-10-CM

## 2023-03-15 LAB
FLUAV RNA RESP QL NAA+PROBE: NOT DETECTED
FLUBV RNA RESP QL NAA+PROBE: NOT DETECTED
S PYO AG THROAT QL: NEGATIVE
SARS-COV-2 RNA RESP QL NAA+PROBE: NOT DETECTED

## 2023-03-15 PROCEDURE — 87081 CULTURE SCREEN ONLY: CPT

## 2023-03-15 PROCEDURE — C9803 HOPD COVID-19 SPEC COLLECT: HCPCS

## 2023-03-15 PROCEDURE — 87636 SARSCOV2 & INF A&B AMP PRB: CPT

## 2023-03-15 PROCEDURE — 87880 STREP A ASSAY W/OPTIC: CPT

## 2023-03-15 PROCEDURE — 99283 EMERGENCY DEPT VISIT LOW MDM: CPT

## 2023-03-15 PROCEDURE — 36415 COLL VENOUS BLD VENIPUNCTURE: CPT

## 2023-03-15 RX ORDER — BROMPHENIRAMINE MALEATE, PSEUDOEPHEDRINE HYDROCHLORIDE, AND DEXTROMETHORPHAN HYDROBROMIDE 2; 30; 10 MG/5ML; MG/5ML; MG/5ML
5 SYRUP ORAL 4 TIMES DAILY PRN
Qty: 118 ML | Refills: 0 | Status: SHIPPED | OUTPATIENT
Start: 2023-03-15

## 2023-03-15 RX ADMIN — TOPICAL ANESTHETIC 1 SPRAY: 200 SPRAY DENTAL; PERIODONTAL at 13:12

## 2023-03-15 NOTE — ED PROVIDER NOTES
"Subjective  History of Present Illness:    Patient is a 57-year-old male here today with sore throat, cough, and left eye drainage.  This started approximately 2 days ago.  He thinks that the sore throat started first followed by the other symptoms.  He notes that if he attempts to blow his nose he will hear or see air bubbles come out of his left eye.  The drainage is to the nasal side of his left eye and describes it as clear and watery.  No fever, nausea, vomiting, diarrhea, or shortness of breath he is aware of.  Exposed to a family member recently who also was experiencing a sore throat.  Patient states that he has never had COVID, but was vaccinated for this.  Otherwise patient has a significant medical history of hypertension.    Nurses Notes reviewed and agree, including vitals, allergies, social history and prior medical history.     REVIEW OF SYSTEMS: All systems reviewed and not pertinent unless noted.  Review of Systems    Past Medical History:   Diagnosis Date   • Arthritis    • Collar bone fracture    • Head injury due to trauma    • Hypertension    • Shoulder fracture, left        Allergies:    Patient has no known allergies.      History reviewed. No pertinent surgical history.      Social History     Socioeconomic History   • Marital status: Single   Tobacco Use   • Smoking status: Never   • Smokeless tobacco: Never   Vaping Use   • Vaping Use: Never used   Substance and Sexual Activity   • Alcohol use: No   • Drug use: No   • Sexual activity: Defer         History reviewed. No pertinent family history.    Objective  Physical Exam:  /84 (BP Location: Left arm, Patient Position: Sitting)   Pulse 82   Temp 97.5 °F (36.4 °C) (Oral)   Resp 16   Ht 160 cm (63\")   Wt 76.2 kg (168 lb)   SpO2 98%   BMI 29.76 kg/m²      Physical Exam  Vitals and nursing note reviewed.   Constitutional:       Appearance: Normal appearance.   HENT:      Head: Normocephalic and atraumatic.      Right Ear: Tympanic " membrane, ear canal and external ear normal.      Left Ear: Tympanic membrane, ear canal and external ear normal.      Nose: Nose normal.      Mouth/Throat:      Mouth: Mucous membranes are moist.      Pharynx: Oropharynx is clear. Uvula midline. Posterior oropharyngeal erythema present.   Eyes:      Extraocular Movements: Extraocular movements intact.      Conjunctiva/sclera:      Right eye: Right conjunctiva is injected.      Left eye: Left conjunctiva is injected.      Pupils: Pupils are equal, round, and reactive to light.   Neck:      Vascular: No carotid bruit.   Cardiovascular:      Rate and Rhythm: Normal rate and regular rhythm.      Pulses: Normal pulses.      Heart sounds: Normal heart sounds.   Pulmonary:      Effort: Pulmonary effort is normal.      Breath sounds: Normal breath sounds.   Abdominal:      General: Abdomen is flat. Bowel sounds are normal. There is no distension.      Palpations: Abdomen is soft.      Tenderness: There is no abdominal tenderness.   Musculoskeletal:      Cervical back: Neck supple. No tenderness.      Right lower leg: No edema.      Left lower leg: No edema.   Lymphadenopathy:      Cervical: No cervical adenopathy.   Skin:     General: Skin is warm and dry.      Capillary Refill: Capillary refill takes less than 2 seconds.   Neurological:      General: No focal deficit present.      Mental Status: He is alert and oriented to person, place, and time.   Psychiatric:         Mood and Affect: Mood normal.         Behavior: Behavior normal.         Procedures    ED Course:         Lab Results (last 24 hours)     Procedure Component Value Units Date/Time    COVID-19 and FLU A/B PCR - Swab, Nasopharynx [[id number]]  (Normal) Collected: 03/15/23 1211    Specimen: Swab from Nasopharynx Updated: 03/15/23 1240     COVID19 Not Detected     Influenza A PCR Not Detected     Influenza B PCR Not Detected    Narrative:      Fact sheet for providers:  https://www.fda.gov/media/329582/download    Fact sheet for patients: https://www.fda.gov/media/590080/download    Test performed by PCR.    Rapid Strep A Screen - Swab, Throat [[id number]]  (Normal) Collected: 03/15/23 1211    Specimen: Swab from Throat Updated: 03/15/23 1223     Strep A Ag Negative    Beta Strep Culture, Throat - Swab, Throat [[id number]] Collected: 03/15/23 1211    Specimen: Swab from Throat Updated: 03/15/23 1223           No radiology results from the last 24 hrs       MDM    Initial impression of presenting illness: cough, otorrhea, and sore throat    DDX: includes but is not limited to: Influenza, COVID-19, other viral illness, strep pharyngitis, acute sinusitis.    Patient arrives hemodynamically stable with vitals interpreted by myself.     Pertinent features from physical exam: bilateral injected conjunctivae and erythematous posterior pharynx.    Initial diagnostic plan: Strep swab, covid-flu swab, Hurricaine spary    Results from initial plan were reviewed and interpreted by me revealing negative strep    Diagnostic information from other sources: medical record    Interventions / Re-evaluation: Provided Hurricaine spray to help with pain.  Discussed results with patient and advised him that he most likely has a viral illness not tested for today.  Provided him with a prescription for Bromfed to use as needed as well as instructed him to use over-the-counter medications to help with his symptoms.    Consultations/Discussion of results with other physicians: None    Disposition plan: Patient is a 57-year-old male here today with a viral illness.  Safe for discharge.  -----    Final diagnoses:   Viral URI        Juan Jose Hernandez, APRN  03/15/23 2330

## 2023-03-15 NOTE — DISCHARGE INSTRUCTIONS
You are negative for COVID and influenza.  Also negative for strep.  Most likely experiencing a viral illness from a virus not tested today.  Use the prescribed cough medicine as needed as well as over-the-counter medications to treat symptoms.  May take a few days to resolve.  Follow-up with your primary provider later this week or early next week for reevaluation.

## 2023-03-17 LAB — BACTERIA SPEC AEROBE CULT: NORMAL

## 2023-06-17 ENCOUNTER — APPOINTMENT (OUTPATIENT)
Dept: CT IMAGING | Facility: HOSPITAL | Age: 58
End: 2023-06-17
Payer: OTHER GOVERNMENT

## 2023-06-17 ENCOUNTER — HOSPITAL ENCOUNTER (EMERGENCY)
Facility: HOSPITAL | Age: 58
Discharge: HOME | End: 2023-06-17
Attending: STUDENT IN AN ORGANIZED HEALTH CARE EDUCATION/TRAINING PROGRAM
Payer: OTHER GOVERNMENT

## 2023-06-17 VITALS
WEIGHT: 172 LBS | OXYGEN SATURATION: 99 % | TEMPERATURE: 97.8 F | RESPIRATION RATE: 16 BRPM | DIASTOLIC BLOOD PRESSURE: 90 MMHG | BODY MASS INDEX: 30.48 KG/M2 | HEIGHT: 63 IN | HEART RATE: 81 BPM | SYSTOLIC BLOOD PRESSURE: 145 MMHG

## 2023-06-17 DIAGNOSIS — R42 VERTIGO: ICD-10-CM

## 2023-06-17 DIAGNOSIS — R42 DIZZINESS: Primary | ICD-10-CM

## 2023-06-17 LAB
ALBUMIN SERPL-MCNC: 4.2 G/DL (ref 3.5–5.2)
ALBUMIN/GLOB SERPL: 1.6 G/DL
ALP SERPL-CCNC: 111 U/L (ref 39–117)
ALT SERPL W P-5'-P-CCNC: 33 U/L (ref 1–41)
AMPHET+METHAMPHET UR QL: NEGATIVE
AMPHETAMINES UR QL: NEGATIVE
ANION GAP SERPL CALCULATED.3IONS-SCNC: 9.6 MMOL/L (ref 5–15)
AST SERPL-CCNC: 24 U/L (ref 1–40)
BARBITURATES UR QL SCN: NEGATIVE
BASOPHILS # BLD AUTO: 0.06 10*3/MM3 (ref 0–0.2)
BASOPHILS NFR BLD AUTO: 0.9 % (ref 0–1.5)
BENZODIAZ UR QL SCN: NEGATIVE
BILIRUB SERPL-MCNC: 0.3 MG/DL (ref 0–1.2)
BILIRUB UR QL STRIP: NEGATIVE
BUN SERPL-MCNC: 24 MG/DL (ref 6–20)
BUN/CREAT SERPL: 32.4 (ref 7–25)
BUPRENORPHINE SERPL-MCNC: NEGATIVE NG/ML
CALCIUM SPEC-SCNC: 9.9 MG/DL (ref 8.6–10.5)
CANNABINOIDS SERPL QL: NEGATIVE
CHLORIDE SERPL-SCNC: 105 MMOL/L (ref 98–107)
CLARITY UR: CLEAR
CO2 SERPL-SCNC: 25.4 MMOL/L (ref 22–29)
COCAINE UR QL: NEGATIVE
COLOR UR: YELLOW
CREAT SERPL-MCNC: 0.74 MG/DL (ref 0.76–1.27)
DEPRECATED RDW RBC AUTO: 40 FL (ref 37–54)
EGFRCR SERPLBLD CKD-EPI 2021: 105.7 ML/MIN/1.73
EOSINOPHIL # BLD AUTO: 0.33 10*3/MM3 (ref 0–0.4)
EOSINOPHIL NFR BLD AUTO: 5 % (ref 0.3–6.2)
ERYTHROCYTE [DISTWIDTH] IN BLOOD BY AUTOMATED COUNT: 14.2 % (ref 12.3–15.4)
ETHANOL BLD-MCNC: <10 MG/DL (ref 0–10)
ETHANOL UR QL: <0.01 %
GLOBULIN UR ELPH-MCNC: 2.6 GM/DL
GLUCOSE SERPL-MCNC: 86 MG/DL (ref 65–99)
GLUCOSE UR STRIP-MCNC: NEGATIVE MG/DL
HCT VFR BLD AUTO: 43.5 % (ref 37.5–51)
HGB BLD-MCNC: 14.8 G/DL (ref 13–17.7)
HGB UR QL STRIP.AUTO: NEGATIVE
HOLD SPECIMEN: NORMAL
HOLD SPECIMEN: NORMAL
IMM GRANULOCYTES # BLD AUTO: 0.05 10*3/MM3 (ref 0–0.05)
IMM GRANULOCYTES NFR BLD AUTO: 0.8 % (ref 0–0.5)
KETONES UR QL STRIP: NEGATIVE
LEUKOCYTE ESTERASE UR QL STRIP.AUTO: NEGATIVE
LYMPHOCYTES # BLD AUTO: 2.18 10*3/MM3 (ref 0.7–3.1)
LYMPHOCYTES NFR BLD AUTO: 32.7 % (ref 19.6–45.3)
MAGNESIUM SERPL-MCNC: 2.2 MG/DL (ref 1.6–2.6)
MCH RBC QN AUTO: 26.6 PG (ref 26.6–33)
MCHC RBC AUTO-ENTMCNC: 34 G/DL (ref 31.5–35.7)
MCV RBC AUTO: 78.2 FL (ref 79–97)
METHADONE UR QL SCN: NEGATIVE
MONOCYTES # BLD AUTO: 0.66 10*3/MM3 (ref 0.1–0.9)
MONOCYTES NFR BLD AUTO: 9.9 % (ref 5–12)
NEUTROPHILS NFR BLD AUTO: 3.38 10*3/MM3 (ref 1.7–7)
NEUTROPHILS NFR BLD AUTO: 50.7 % (ref 42.7–76)
NITRITE UR QL STRIP: NEGATIVE
NRBC BLD AUTO-RTO: 0 /100 WBC (ref 0–0.2)
OPIATES UR QL: NEGATIVE
OXYCODONE UR QL SCN: NEGATIVE
PCP UR QL SCN: NEGATIVE
PH UR STRIP.AUTO: 5.5 [PH] (ref 5–8)
PLATELET # BLD AUTO: 220 10*3/MM3 (ref 140–450)
PMV BLD AUTO: 9.9 FL (ref 6–12)
POTASSIUM SERPL-SCNC: 4.1 MMOL/L (ref 3.5–5.2)
PROPOXYPH UR QL: NEGATIVE
PROT SERPL-MCNC: 6.8 G/DL (ref 6–8.5)
PROT UR QL STRIP: NEGATIVE
RBC # BLD AUTO: 5.56 10*6/MM3 (ref 4.14–5.8)
SODIUM SERPL-SCNC: 140 MMOL/L (ref 136–145)
SP GR UR STRIP: 1.01 (ref 1–1.03)
TRICYCLICS UR QL SCN: NEGATIVE
TROPONIN T SERPL HS-MCNC: <6 NG/L
UROBILINOGEN UR QL STRIP: NORMAL
WBC NRBC COR # BLD: 6.66 10*3/MM3 (ref 3.4–10.8)
WHOLE BLOOD HOLD COAG: NORMAL
WHOLE BLOOD HOLD SPECIMEN: NORMAL

## 2023-06-17 PROCEDURE — 80306 DRUG TEST PRSMV INSTRMNT: CPT | Performed by: PHYSICIAN ASSISTANT

## 2023-06-17 PROCEDURE — 80053 COMPREHEN METABOLIC PANEL: CPT | Performed by: PHYSICIAN ASSISTANT

## 2023-06-17 PROCEDURE — 84484 ASSAY OF TROPONIN QUANT: CPT | Performed by: PHYSICIAN ASSISTANT

## 2023-06-17 PROCEDURE — 70450 CT HEAD/BRAIN W/O DYE: CPT

## 2023-06-17 PROCEDURE — 83735 ASSAY OF MAGNESIUM: CPT | Performed by: PHYSICIAN ASSISTANT

## 2023-06-17 PROCEDURE — 81003 URINALYSIS AUTO W/O SCOPE: CPT | Performed by: PHYSICIAN ASSISTANT

## 2023-06-17 PROCEDURE — 93005 ELECTROCARDIOGRAM TRACING: CPT | Performed by: STUDENT IN AN ORGANIZED HEALTH CARE EDUCATION/TRAINING PROGRAM

## 2023-06-17 PROCEDURE — 82077 ASSAY SPEC XCP UR&BREATH IA: CPT | Performed by: PHYSICIAN ASSISTANT

## 2023-06-17 PROCEDURE — 85025 COMPLETE CBC W/AUTO DIFF WBC: CPT | Performed by: PHYSICIAN ASSISTANT

## 2023-06-17 RX ORDER — SODIUM CHLORIDE 0.9 % (FLUSH) 0.9 %
10 SYRINGE (ML) INJECTION AS NEEDED
Status: DISCONTINUED | OUTPATIENT
Start: 2023-06-17 | End: 2023-06-17 | Stop reason: HOSPADM

## 2023-06-17 RX ORDER — MECLIZINE HYDROCHLORIDE 25 MG/1
25 TABLET ORAL 3 TIMES DAILY PRN
Qty: 12 TABLET | Refills: 0 | Status: SHIPPED | OUTPATIENT
Start: 2023-06-17

## 2023-06-17 RX ORDER — MECLIZINE HYDROCHLORIDE 25 MG/1
25 TABLET ORAL ONCE
Status: COMPLETED | OUTPATIENT
Start: 2023-06-17 | End: 2023-06-17

## 2023-06-17 RX ADMIN — MECLIZINE HYDROCHLORIDE 25 MG: 25 TABLET ORAL at 16:09

## 2023-06-17 RX ADMIN — SODIUM CHLORIDE 1000 ML: 9 INJECTION, SOLUTION INTRAVENOUS at 16:09

## 2023-06-17 NOTE — ED PROVIDER NOTES
"Subjective  History of Present Illness:    Chief Complaint: Dizziness  History of Present Illness: 58-year-old male presents with dizziness for 4 days intermittently, he states when the dizziness for started several days ago it was worse when he would stand, he stood up felt the room spinning fell and almost passed out several days ago.  Since then he has had intermittent dizziness that is worse with movement especially of his head, and changing positions.  He has never had dizziness like this before.  No recent illnesses, no chest pain or shortness of breath, no previous medical problems  Onset: 4 days gradual onset  Duration: 4 days  Exacerbating / Alleviating factors: Worse with movement, improvement with lying still  Associated symptoms: Near passing out several days ago      Nurses Notes reviewed and agree, including vitals, allergies, social history and prior medical history.     REVIEW OF SYSTEMS: All systems reviewed and not pertinent unless noted.    Review of Systems   Neurological:  Positive for dizziness and light-headedness.   All other systems reviewed and are negative.    Past Medical History:   Diagnosis Date    Arthritis     Collar bone fracture     Head injury due to trauma     Hypertension     Shoulder fracture, left        Allergies:    Patient has no known allergies.      History reviewed. No pertinent surgical history.      Social History     Socioeconomic History    Marital status: Single   Tobacco Use    Smoking status: Never    Smokeless tobacco: Never   Vaping Use    Vaping Use: Never used   Substance and Sexual Activity    Alcohol use: No    Drug use: No    Sexual activity: Defer         History reviewed. No pertinent family history.    Objective  Physical Exam:  /90   Pulse 81   Temp 97.8 °F (36.6 °C) (Oral)   Resp 16   Ht 160 cm (63\")   Wt 78 kg (172 lb)   SpO2 99%   BMI 30.47 kg/m²      Physical Exam  Vitals and nursing note reviewed.   Constitutional:       Appearance: He " is well-developed.   HENT:      Head: Normocephalic and atraumatic.      Mouth/Throat:      Mouth: Mucous membranes are moist.   Eyes:      Extraocular Movements: Extraocular movements intact.   Cardiovascular:      Rate and Rhythm: Normal rate and regular rhythm.   Pulmonary:      Effort: Pulmonary effort is normal.      Breath sounds: Normal breath sounds.   Abdominal:      Palpations: Abdomen is soft.   Musculoskeletal:         General: Normal range of motion.      Cervical back: Normal range of motion.   Skin:     General: Skin is warm and dry.   Neurological:      General: No focal deficit present.      Mental Status: He is alert and oriented to person, place, and time.      Sensory: No sensory deficit.      Motor: No weakness.   Psychiatric:         Behavior: Behavior normal.         Thought Content: Thought content normal.         Judgment: Judgment normal.         Procedures    ED Course:    ED Course as of 06/17/23 1747   Sat Jun 17, 2023   1543 EKG interpreted by me, normal sinus rhythm no concerning ST changes noted, rate of 66 [JE]      ED Course User Index  [JE] Igor Glass MD       Lab Results (last 24 hours)       Procedure Component Value Units Date/Time    CBC Auto Differential [[id number]]  (Abnormal) Collected: 06/17/23 1536    Specimen: Blood Updated: 06/17/23 1548     WBC 6.66 10*3/mm3      RBC 5.56 10*6/mm3      Hemoglobin 14.8 g/dL      Hematocrit 43.5 %      MCV 78.2 fL      MCH 26.6 pg      MCHC 34.0 g/dL      RDW 14.2 %      RDW-SD 40.0 fl      MPV 9.9 fL      Platelets 220 10*3/mm3      Neutrophil % 50.7 %      Lymphocyte % 32.7 %      Monocyte % 9.9 %      Eosinophil % 5.0 %      Basophil % 0.9 %      Immature Grans % 0.8 %      Neutrophils, Absolute 3.38 10*3/mm3      Lymphocytes, Absolute 2.18 10*3/mm3      Monocytes, Absolute 0.66 10*3/mm3      Eosinophils, Absolute 0.33 10*3/mm3      Basophils, Absolute 0.06 10*3/mm3      Immature Grans, Absolute 0.05 10*3/mm3      nRBC 0.0  /100 WBC     Comprehensive Metabolic Panel [[id number]]  (Abnormal) Collected: 06/17/23 1536    Specimen: Blood Updated: 06/17/23 1558     Glucose 86 mg/dL      BUN 24 mg/dL      Creatinine 0.74 mg/dL      Sodium 140 mmol/L      Potassium 4.1 mmol/L      Chloride 105 mmol/L      CO2 25.4 mmol/L      Calcium 9.9 mg/dL      Total Protein 6.8 g/dL      Albumin 4.2 g/dL      ALT (SGPT) 33 U/L      AST (SGOT) 24 U/L      Alkaline Phosphatase 111 U/L      Total Bilirubin 0.3 mg/dL      Globulin 2.6 gm/dL      A/G Ratio 1.6 g/dL      BUN/Creatinine Ratio 32.4     Anion Gap 9.6 mmol/L      eGFR 105.7 mL/min/1.73     Narrative:      GFR Normal >60  Chronic Kidney Disease <60  Kidney Failure <15      Single High Sensitivity Troponin T [[id number]]  (Normal) Collected: 06/17/23 1536    Specimen: Blood Updated: 06/17/23 1601     HS Troponin T <6 ng/L     Narrative:      High Sensitive Troponin T Reference Range:  <10.0 ng/L- Negative Female for AMI  <15.0 ng/L- Negative Male for AMI  >=10 - Abnormal Female indicating possible myocardial injury.  >=15 - Abnormal Male indicating possible myocardial injury.   Clinicians would have to utilize clinical acumen, EKG, Troponin, and serial changes to determine if it is an Acute Myocardial Infarction or myocardial injury due to an underlying chronic condition.         Magnesium [[id number]]  (Normal) Collected: 06/17/23 1536    Specimen: Blood Updated: 06/17/23 1558     Magnesium 2.2 mg/dL     Ethanol [[id number]] Collected: 06/17/23 1536    Specimen: Blood Updated: 06/17/23 1558     Ethanol <10 mg/dL      Ethanol % <0.010 %     Narrative:      This result is for medical use only and should not be used for forensic purposes.    Urine Drug Screen - Urine, Clean Catch [[id number]]  (Normal) Collected: 06/17/23 1704    Specimen: Urine, Clean Catch Updated: 06/17/23 1719     THC, Screen, Urine Negative     Phencyclidine (PCP), Urine Negative     Cocaine Screen, Urine Negative     Methamphetamine,  Ur Negative     Opiate Screen Negative     Amphetamine Screen, Urine Negative     Benzodiazepine Screen, Urine Negative     Tricyclic Antidepressants Screen Negative     Methadone Screen, Urine Negative     Barbiturates Screen, Urine Negative     Oxycodone Screen, Urine Negative     Propoxyphene Screen Negative     Buprenorphine, Screen, Urine Negative    Narrative:      Limitations of this procedure include the possibility of false positives due to interfering substances in the urine sample. Clinical data should be correlated with any questionable result. Positive results should be considered Presumptive Positive until results are confirmed with another methodology such as HPLC or GCMS.    Urinalysis With Culture If Indicated - Urine, Clean Catch [[id number]]  (Normal) Collected: 06/17/23 1704    Specimen: Urine, Clean Catch Updated: 06/17/23 1718     Color, UA Yellow     Appearance, UA Clear     pH, UA 5.5     Specific Gravity, UA 1.013     Glucose, UA Negative     Ketones, UA Negative     Bilirubin, UA Negative     Blood, UA Negative     Protein, UA Negative     Leuk Esterase, UA Negative     Nitrite, UA Negative     Urobilinogen, UA 0.2 E.U./dL    Narrative:      In absence of clinical symptoms, the presence of pyuria, bacteria, and/or nitrites on the urinalysis result does not correlate with infection.  Urine microscopic not indicated.             CT Head Without Contrast    Result Date: 6/17/2023  PROCEDURE: CT HEAD WO CONTRAST-  INDICATION: dizzy  TECHNIQUE: Multiple axial CT images were performed from the foramen magnum to the vertex without contrast.   Coronal reconstruction images were obtained from the axial data.  COMPARISON: 6/29/2021.  FINDINGS: There is no mass effect or midline shift.  No hydrocephlus or intracranial hemorrhage. Gray-white differentiation is preserved. The posterior fossa is without acute abnormality. The basilar cisterns are preserved..  There is no air-fluid level in the paranasal  sinuses. There are mucous retention cysts or polyps in the maxillary sinuses. No acute osseous abnormalities are present.      Impression: No acute intracranial abnormality.       584.72 mGy.cm    This study was performed with techniques to keep radiation doses as low as reasonably achievable (ALARA). Individualized dose reduction techniques using automated exposure control or adjustment of mA and/or kV according to the patient size were employed.  This report was signed and finalized on 6/17/2023 4:07 PM by Ila Duran MD.        Medical Decision Making  58-year-old male presents with dizziness.  Differential would include vertigo, intracranial abnormality, electrolyte abnormality, dehydration, medication reaction.  On evaluation patient did not have any acute neurologic abnormalities, he did exhibit dizziness with change of position.  An IV was established, labs were drawn, interpreted myself and discussed with the patient and family the bedside, he received a liter of normal saline, and oral meclizine.  CT scan of his head was performed, that was unremarkable, I discussed the results with the patient and family at the bedside, also reviewed and summarized.  Medical records including labs and radiology reports.  Upon reevaluation the medication did help with his dizziness, he was given education and instruction on vertigo and dizziness and symptoms to look out for.  He is stable for discharge at this time with recommendation of follow-up with his primary care physician and return if symptoms worsen.    Problems Addressed:  Dizziness: complicated acute illness or injury  Vertigo: complicated acute illness or injury    Amount and/or Complexity of Data Reviewed  Labs: ordered.  Radiology: ordered.  ECG/medicine tests: ordered.    Risk  Prescription drug management.          Final diagnoses:   Dizziness   Vertigo          Bradley Schofield Jr., PA-C  06/17/23 9128

## 2023-06-17 NOTE — Clinical Note
[facility] EMERGENCY DEPARTMENT  [address] [address]  Phone: [phone]    Андрей Ortiz was seen and treated in our emergency department on 6/17/2023.  He may return to work on 06/19/2023.         Thank you for choosing [facility].    Igor Glass MD

## 2023-10-31 ENCOUNTER — APPOINTMENT (OUTPATIENT)
Dept: CT IMAGING | Facility: HOSPITAL | Age: 58
End: 2023-10-31
Payer: OTHER GOVERNMENT

## 2023-10-31 ENCOUNTER — APPOINTMENT (OUTPATIENT)
Dept: GENERAL RADIOLOGY | Facility: HOSPITAL | Age: 58
End: 2023-10-31
Payer: OTHER GOVERNMENT

## 2023-10-31 ENCOUNTER — HOSPITAL ENCOUNTER (EMERGENCY)
Facility: HOSPITAL | Age: 58
Discharge: HOME | End: 2023-10-31
Attending: EMERGENCY MEDICINE | Admitting: EMERGENCY MEDICINE
Payer: OTHER GOVERNMENT

## 2023-10-31 VITALS
BODY MASS INDEX: 31.54 KG/M2 | HEIGHT: 63 IN | TEMPERATURE: 98 F | OXYGEN SATURATION: 98 % | SYSTOLIC BLOOD PRESSURE: 128 MMHG | WEIGHT: 178 LBS | HEART RATE: 60 BPM | DIASTOLIC BLOOD PRESSURE: 82 MMHG | RESPIRATION RATE: 16 BRPM

## 2023-10-31 DIAGNOSIS — S09.8XXA BLUNT HEAD TRAUMA, INITIAL ENCOUNTER: Primary | ICD-10-CM

## 2023-10-31 DIAGNOSIS — S43.402A SPRAIN OF LEFT SHOULDER, UNSPECIFIED SHOULDER SPRAIN TYPE, INITIAL ENCOUNTER: ICD-10-CM

## 2023-10-31 PROCEDURE — 73030 X-RAY EXAM OF SHOULDER: CPT

## 2023-10-31 PROCEDURE — 70450 CT HEAD/BRAIN W/O DYE: CPT

## 2023-10-31 PROCEDURE — 99284 EMERGENCY DEPT VISIT MOD MDM: CPT

## 2023-10-31 NOTE — Clinical Note
[facility] EMERGENCY DEPARTMENT  [address] [facility] [address]  Phone: [phone]    Андрей Ortiz was seen and treated in our emergency department on 10/31/2023.  He may return to work on 11/02/2023.         Thank you for choosing [facility].    Prieto Saleh, DO

## 2023-10-31 NOTE — Clinical Note
[facility] EMERGENCY DEPARTMENT  [address] [address]  Phone: [phone]    Андрей Ortiz was seen and treated in our emergency department on 10/31/2023.  He may return to work on 11/01/2023.         Thank you for choosing [facility].    Prieto Saleh, DO
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
## 2024-01-11 ENCOUNTER — HOSPITAL ENCOUNTER (EMERGENCY)
Facility: HOSPITAL | Age: 59
Discharge: HOME | End: 2024-01-11
Attending: EMERGENCY MEDICINE
Payer: OTHER GOVERNMENT

## 2024-01-11 VITALS
WEIGHT: 178 LBS | OXYGEN SATURATION: 98 % | HEART RATE: 73 BPM | DIASTOLIC BLOOD PRESSURE: 93 MMHG | SYSTOLIC BLOOD PRESSURE: 186 MMHG | HEIGHT: 63 IN | RESPIRATION RATE: 19 BRPM | BODY MASS INDEX: 31.54 KG/M2 | TEMPERATURE: 98.4 F

## 2024-01-11 DIAGNOSIS — H66.001 ACUTE SUPPURATIVE OTITIS MEDIA OF RIGHT EAR WITHOUT SPONTANEOUS RUPTURE OF TYMPANIC MEMBRANE, RECURRENCE NOT SPECIFIED: Primary | ICD-10-CM

## 2024-01-11 DIAGNOSIS — B34.9 VIRAL SYNDROME: ICD-10-CM

## 2024-01-11 PROCEDURE — 99282 EMERGENCY DEPT VISIT SF MDM: CPT

## 2024-01-11 RX ORDER — MECLIZINE HYDROCHLORIDE 25 MG/1
25 TABLET ORAL 3 TIMES DAILY PRN
Qty: 30 TABLET | Refills: 0 | Status: SHIPPED | OUTPATIENT
Start: 2024-01-11

## 2024-01-11 RX ORDER — AMOXICILLIN AND CLAVULANATE POTASSIUM 875; 125 MG/1; MG/1
1 TABLET, FILM COATED ORAL 2 TIMES DAILY
Qty: 20 TABLET | Refills: 0 | Status: SHIPPED | OUTPATIENT
Start: 2024-01-11 | End: 2024-01-21

## 2024-01-11 NOTE — Clinical Note
[facility] EMERGENCY DEPARTMENT  [address] [address]  Phone: [phone]    Андрей Ortiz was seen and treated in our emergency department on 1/11/2024.  He may return to work on 01/13/2024.         Thank you for choosing [facility].    Ruperto Chen MD

## 2024-01-11 NOTE — Clinical Note
[facility] EMERGENCY DEPARTMENT  [address] [address]  Phone: [phone]    Андрей Ortiz was seen and treated in our emergency department on 1/11/2024.  He may return to work on 01/13/2024.         Thank you for choosing [facility].    Ruperto Chen MD

## 2024-01-11 NOTE — ED PROVIDER NOTES
Subjective   History of Present Illness  57 y/o male who presents to the emergency department with complaint of right earache.  Patient states has had nasal congestion, cough.  Patient complains of aching, throbbing right ear pain.  Denies any other associated complaints.  States has mild dizziness.    History provided by:  Patient   used: No    Earache  Location:  Right  Behind ear:  No abnormality  Quality:  Dull, throbbing and pressure  Severity:  Moderate  Onset quality:  Gradual  Duration:  2 days  Timing:  Intermittent  Progression:  Worsening  Chronicity:  New  Context: not direct blow, not elevation change and not foreign body in ear    Relieved by:  None tried  Worsened by:  Nothing  Ineffective treatments:  None tried  Associated symptoms: congestion    Associated symptoms: no abdominal pain, no cough, no diarrhea, no fever, no headaches, no hearing loss, no neck pain, no sore throat and no tinnitus    Risk factors: no recent travel        Review of Systems   Constitutional: Negative.  Negative for appetite change, chills, diaphoresis and fever.   HENT:  Positive for congestion and ear pain. Negative for hearing loss, sore throat and tinnitus.    Eyes: Negative.  Negative for photophobia, pain and itching.   Respiratory: Negative.  Negative for cough.    Cardiovascular: Negative.  Negative for chest pain and leg swelling.   Gastrointestinal: Negative.  Negative for abdominal pain, anal bleeding and diarrhea.   Endocrine: Negative.  Negative for cold intolerance, heat intolerance, polyphagia and polyuria.   Genitourinary: Negative.  Negative for enuresis, flank pain, genital sores, hematuria, penile discharge, scrotal swelling and testicular pain.   Musculoskeletal: Negative.  Negative for arthralgias, back pain, gait problem, joint swelling and neck pain.   Skin: Negative.  Negative for pallor.   Neurological: Negative.  Negative for seizures, numbness and headaches.   Hematological:  Negative.    Psychiatric/Behavioral: Negative.  Negative for confusion, decreased concentration, dysphoric mood and hallucinations.    All other systems reviewed and are negative.      Past Medical History:   Diagnosis Date    Arthritis     Collar bone fracture     Head injury due to trauma     Hypertension     Shoulder fracture, left        No Known Allergies    History reviewed. No pertinent surgical history.    History reviewed. No pertinent family history.    Social History     Socioeconomic History    Marital status: Single   Tobacco Use    Smoking status: Never    Smokeless tobacco: Never   Vaping Use    Vaping Use: Never used   Substance and Sexual Activity    Alcohol use: No    Drug use: No    Sexual activity: Defer           Objective   Physical Exam  Vitals and nursing note reviewed.   Constitutional:       General: He is not in acute distress.     Appearance: Normal appearance. He is normal weight. He is not ill-appearing, toxic-appearing or diaphoretic.   HENT:      Head: Normocephalic and atraumatic.      Right Ear: Tympanic membrane, ear canal and external ear normal. There is no impacted cerumen. Tympanic membrane is injected, erythematous and bulging.      Left Ear: Tympanic membrane, ear canal and external ear normal. There is no impacted cerumen.      Nose: Congestion and rhinorrhea present.      Mouth/Throat:      Mouth: Mucous membranes are moist.      Pharynx: Oropharynx is clear. No oropharyngeal exudate or posterior oropharyngeal erythema.   Eyes:      General: No scleral icterus.        Right eye: No discharge.         Left eye: No discharge.      Extraocular Movements: Extraocular movements intact.      Conjunctiva/sclera: Conjunctivae normal.      Pupils: Pupils are equal, round, and reactive to light.   Neck:      Vascular: No carotid bruit.   Cardiovascular:      Rate and Rhythm: Normal rate and regular rhythm.      Pulses: Normal pulses.      Heart sounds: Normal heart sounds. No murmur  heard.     No friction rub. No gallop.   Pulmonary:      Effort: Pulmonary effort is normal. No respiratory distress.      Breath sounds: Normal breath sounds. No stridor. No wheezing, rhonchi or rales.   Chest:      Chest wall: No tenderness.   Abdominal:      General: Abdomen is flat. Bowel sounds are normal. There is no distension.      Palpations: Abdomen is soft. There is no mass.      Tenderness: There is no abdominal tenderness. There is no right CVA tenderness, guarding or rebound.      Hernia: No hernia is present.   Musculoskeletal:         General: No swelling, tenderness, deformity or signs of injury. Normal range of motion.      Cervical back: Normal range of motion and neck supple. No rigidity or tenderness.      Right lower leg: No edema.   Lymphadenopathy:      Cervical: No cervical adenopathy.   Skin:     General: Skin is warm and dry.      Capillary Refill: Capillary refill takes less than 2 seconds.      Coloration: Skin is not jaundiced or pale.      Findings: No bruising, erythema or lesion.   Neurological:      General: No focal deficit present.      Mental Status: He is alert and oriented to person, place, and time. Mental status is at baseline.      Cranial Nerves: No cranial nerve deficit.      Motor: No weakness.      Coordination: Coordination normal.      Gait: Gait normal.   Psychiatric:         Mood and Affect: Mood normal.         Behavior: Behavior normal.         Thought Content: Thought content normal.         Judgment: Judgment normal.         Procedures           ED Course                                             Medical Decision Making      Final diagnoses:   Acute suppurative otitis media of right ear without spontaneous rupture of tympanic membrane, recurrence not specified   Viral syndrome       ED Disposition  ED Disposition       ED Disposition   Discharge    Condition   Stable    Comment   --               [facility]  [address] [place] [address] [address]  [place] [address]  [phone]  Call in 1 day           Medication List        New Prescriptions      amoxicillin-clavulanate 875-125 MG per tablet  Commonly known as: AUGMENTIN  Take 1 tablet by mouth 2 (Two) Times a Day for 10 days.            Changed      * meclizine 25 MG tablet  Commonly known as: ANTIVERT  Take 1 tablet by mouth 3 (Three) Times a Day As Needed for Dizziness.  What changed: Another medication with the same name was added. Make sure you understand how and when to take each.     * meclizine 25 MG tablet  Commonly known as: ANTIVERT  Take 1 tablet by mouth 3 (Three) Times a Day As Needed for Dizziness.  What changed: You were already taking a medication with the same name, and this prescription was added. Make sure you understand how and when to take each.           * This list has 2 medication(s) that are the same as other medications prescribed for you. Read the directions carefully, and ask your doctor or other care provider to review them with you.                   Where to Get Your Medications        These medications were sent to [facility] [address] [place] [address] - [address] [place] [address] [phone] [facility] [phone]   [address] [facility] [address]      Phone: [phone]   amoxicillin-clavulanate 875-125 MG per tablet  meclizine 25 MG tablet            Rishabh Barragan PA-C  01/11/24 1526